# Patient Record
Sex: FEMALE | Race: WHITE
[De-identification: names, ages, dates, MRNs, and addresses within clinical notes are randomized per-mention and may not be internally consistent; named-entity substitution may affect disease eponyms.]

---

## 2019-07-03 ENCOUNTER — HOSPITAL ENCOUNTER (INPATIENT)
Dept: HOSPITAL 35 - SBH | Age: 84
LOS: 37 days | Discharge: HOSPICE | DRG: 884 | End: 2019-08-09
Attending: PSYCHIATRY & NEUROLOGY | Admitting: PSYCHIATRY & NEUROLOGY
Payer: COMMERCIAL

## 2019-07-03 VITALS — WEIGHT: 145.5 LBS | BODY MASS INDEX: 22.05 KG/M2 | HEIGHT: 68 IN

## 2019-07-03 DIAGNOSIS — F32.9: ICD-10-CM

## 2019-07-03 DIAGNOSIS — F01.50: Primary | ICD-10-CM

## 2019-07-03 DIAGNOSIS — K21.9: ICD-10-CM

## 2019-07-03 DIAGNOSIS — I50.9: ICD-10-CM

## 2019-07-03 DIAGNOSIS — Z79.82: ICD-10-CM

## 2019-07-03 DIAGNOSIS — E44.0: ICD-10-CM

## 2019-07-03 DIAGNOSIS — M19.90: ICD-10-CM

## 2019-07-03 DIAGNOSIS — Z88.0: ICD-10-CM

## 2019-07-03 DIAGNOSIS — I48.91: ICD-10-CM

## 2019-07-03 DIAGNOSIS — F02.81: ICD-10-CM

## 2019-07-03 DIAGNOSIS — Z79.899: ICD-10-CM

## 2019-07-03 DIAGNOSIS — Z79.01: ICD-10-CM

## 2019-07-03 DIAGNOSIS — J44.9: ICD-10-CM

## 2019-07-03 DIAGNOSIS — G30.9: ICD-10-CM

## 2019-07-03 DIAGNOSIS — I48.2: ICD-10-CM

## 2019-07-03 DIAGNOSIS — H61.21: ICD-10-CM

## 2019-07-03 PROCEDURE — 10880: CPT

## 2019-07-04 VITALS — DIASTOLIC BLOOD PRESSURE: 58 MMHG | SYSTOLIC BLOOD PRESSURE: 140 MMHG

## 2019-07-04 VITALS — SYSTOLIC BLOOD PRESSURE: 118 MMHG | DIASTOLIC BLOOD PRESSURE: 80 MMHG

## 2019-07-04 VITALS — SYSTOLIC BLOOD PRESSURE: 112 MMHG | DIASTOLIC BLOOD PRESSURE: 88 MMHG

## 2019-07-04 VITALS — SYSTOLIC BLOOD PRESSURE: 118 MMHG | DIASTOLIC BLOOD PRESSURE: 58 MMHG

## 2019-07-04 LAB
INR PPP: 2.8
PROTHROMBIN TIME: 29.5 SECONDS (ref 9.3–11.4)

## 2019-07-04 NOTE — NUR
PATIENT HIGH FALL RISK - UNSTEADY ON FEET. NEEDS MONITORING WHEN AMBULATING
WITH WALKER. TOOK MEDICATIONS READILY TODAY. NO AGITATION OR AGGRESSION OR
YELLING NOTED. DOES BECOME RESTLESS AND WANDERS NEEDING REDIRECTION. NEEDS
ENOCURAGEMENT AND HELP WITH MEALS. PATIENT DENIES ANY S/I - CONFUSED - ANSWERS
EVERYTHING DON'T KNOW WHAT TO DO NEXT. PATIENT SLEPT FOR AN HOUR IN AFTERNOON.
DOES NOT KNOW HER LIMITATIONS - DR. FLORES SPOKE WITH DAUGHTER ON MEDICAITON
RECONCILATION. MADE ADJUSTMENTS.

## 2019-07-04 NOTE — NUR
Pt restless in day room this ashly. Up and down from chair, pt is redirectable
with min assistance. Pt provided w/c for self propel, pt very unsteady with
walker. Pt compliant with hs meds and assisted with hs snack. Pt tried to
drink from her straw and lick her ice cream. Pt compliant with bsc and hs adl
care. Good eye contact, blunted affect. Speech clear but sentences not
comprehensible.
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
## 2019-07-04 NOTE — NUR
Daughter called stating she will be in tomorrow with a list of previous
medications. She stated Dr Freeman is no longer her mothers dr at the [facility]. She also stated she does not want her mother on sinement.

## 2019-07-04 NOTE — NUR
ARRIVED AS A DIRECT ADMIT FROM [facility] ER VIA [facility] X2 EMT ON A
STRETCHER @ 20:15 ON 07/03/19.  ADMITTING DIAGNOSIS MAJOR NEUROCOGNITIVE AND
BEHAVIOR DISORDERS. DNR WITH DAUGHTER RAINER LARSEN.  REFERRED TO [facility] FOR VEIGN
VERY AGITATED, YELLING AT STAFF CALLING OFFENSIVE NAMES, INCREASED DEMENTIA
AND STAFF AT N.. AT [facility] STRUGGLING TO MANAGE PT MED CHANGES.
/88 93 64 18 93% ON RA WEIGHT 143.3 POUNDS ON BED SCALE.
PIN SIZE WHOLE IN SKIN ON COXXYX.
BRUISE TO R KNEE AND L LE, BRUISE TO RIGHT WRIST.
SLEPT FOR SEVERAL HOURS, THEN AWOKE AND REQUESTED FOOD, SNACK PROVIDED.
QUIETED DOWN FOR AN HOUR THEN AWOKE, WAS CONFUSED AND AGITATED AND ANXIOUS,
PROVIDED 0LANZAPINE 5MG FOR AGITATION AND LORAZEPAM 0.5MG FOR ANXIETY.
PROVIDED ANOTHER SNACK, AN ENSURE BEVERAGE AT PT REQUEST, SHE RELAXED AND
SLEPT. BED IN LOW POSITION AND ALARM ON, WILL CONTINUE TO MONITOR Q12 MIN FOR
PATIENT SAFETY.

## 2019-07-05 VITALS — SYSTOLIC BLOOD PRESSURE: 110 MMHG | DIASTOLIC BLOOD PRESSURE: 70 MMHG

## 2019-07-05 VITALS — DIASTOLIC BLOOD PRESSURE: 54 MMHG | SYSTOLIC BLOOD PRESSURE: 116 MMHG

## 2019-07-05 NOTE — NUR
DID BECOME AGITATED AFTER SONS DEPARTURE FROM UNIT AT 1700-REFUSING TO
EAT,RUNNING WHEELCHAIR INTO TABLES IN DINING ROOM-RAISED VOICE AND ANGRY
FACIAL EXPRESSION-SAT 1;1 WITH PATIENT FOR APPROX 55 MINUTES OFFERING
SUPPORT,REASSURANCE AND REORIENTATION. ZYPREXA 5MG ODT GIVEN PO AT 1730-CALMER
BY APPROX 1830-GIVEN LATE SUPPER TRAY AND ASSISTED TO BED

## 2019-07-05 NOTE — NUR
ORIENTED TO NAME ONLY-SPEECH/CONVERSATION GARBLED AND DIFFICULT TO
FOLLOW-INCOMPREHENSABLE AT TIMES AT TIMES RELEVENT AND GOAL DIRECTED.
AMBULATES WITH USE OF ROLLER WALIKER AND SBA X 1-DENIES C/O PAIN
DISCOMFORT-ABLE TO FEED SELF.

## 2019-07-06 VITALS — DIASTOLIC BLOOD PRESSURE: 63 MMHG | SYSTOLIC BLOOD PRESSURE: 123 MMHG

## 2019-07-06 VITALS — SYSTOLIC BLOOD PRESSURE: 114 MMHG | DIASTOLIC BLOOD PRESSURE: 50 MMHG

## 2019-07-06 NOTE — NUR
PT was unable to complete psysocial assessment with JOY.  However Sarah, Pt's
daughter was avaliable by phone to complete the assessment.  Sw completed
assessment with Sarah.  Sarah and her brother Montrell are have completed a
medicaid application on Pt's behave.  They have bartolo dealing with mediciad for
the past 7 months.  According to Sarah, Pt has battled depression and Anxiety
since the age of 21.  Pt was seeing a phychiatrics at [facility] until Pt went to SNF about 2 years ago.  Joy assured Sarah, that Pt has
been calm today and engaged when encountered.  Sarah had no other questions
or concerns at this time.

## 2019-07-06 NOTE — NUR
PT TALKING OUT LOUD. WENT TO CHECK ON HER. SAT HER UP AND SHE VOIDED IN BSC.
PLACED PATIENT BACK IN BED AND SHE GROANED AND SAID HER LEFT LEG WAS HURTING.
ASSESSED IT AND SEEMS TO BE GENERALIZED IN HER THIGH/KNEE AREA. HURT TO RUB
BUT NOT RED OR WARM SPOTS. NO SOB. REPOSITIONED AND PLACED PILLOW UNDER IT
WHICH DID BRING GREAT RELIEF. PATIENT THEN STATES SHE IS HUNGRY. FED PATIENT A
VANILLA YOGURT. SHE DOESN'T MAKE MUCH SENSE WHEN SHE TALKS BUT SHE KEPT SAYING
SHE IS JUST OLD AND SHE IS  YEARS OLD AND THERE'S NOTHING THAT CAN BE
DONE FOR HER ACHES AND PAINS. PATIENT RESTLESS. GAVE HER OLANZAPINE 5MG ODT
PRN AND COVERED HER WITH EXTRA BLANKET BECAUSE SHE WAS COOL SHE SAID. PATIENT
ANSWERS YES WHEN ASKED IF SHE IS COMFORTABLE NOW. LET HER KNOW I WOULD BE BACK
TO KEEP CHECKING ON HER AND THAT SHE IS SAFE.  PATIENT SAYS THANK YOU. WILL
CONTINUE TO MONITOR.

## 2019-07-06 NOTE — NUR
PATIENT LAYING AWAKE AS I DID ROOM CHECK ON PATIENT. SHE MOTIONED TO HER LEFT
LEG AND INDICATED PAIN OR DISCOMFORT. PATIENT UNABLE TO GET APPROPRIATE WORDS
TO USE. REPOSITIONED PATIENT AND MASSAGED LEG. NO WARM/RED SPOTS. PATIENT WAS
ABLE TO SAY YES WHEN ASKED IF SHE WOULD LIKE SOMETHING FOR PAIN. TYLENOL 650MG
GIVEN WITH YOGURT AND WATER. PATIENT WAS BACK TO SLEEP WITHIN 10 MINUTES AND
SLEEPING SOUNDLY. WILL CONTINUE TO MONITOR.

## 2019-07-06 NOTE — NUR
ASSUMED CARE AT 0700 THIS MORNING.  PT. UP IN W/C ON UNIT FOR BREAKFAST AND
MORNING MEETINGS.  IS NOT FEEDING HERSELF, SHE IS TOO DISORGANIZED.  STAFF
ASSISTED HER IN EATING.  SPEECH IS WORD SALAD AND GARBLED.  SHE WILL SIT AND
LISTEN WHEN YOU TALK TO HER AS IF SHE UNDERSTANDS WHAT YOU ARE SAYING AND IS
ABLE TO FOLLOW SIMPLE COMMANDS.  DID ATTEND MORNING MEETINGS, BUT UNABLE TO
PARTICIPATE.  SHE SAT AND LISTENED THOUGH.  AT TIMES SHE LOOKED AS IF SHE
WOULD LIKE TO DO AS THE OTHER PATIENTS DID (STRETCHING EXERCISES), BUT UNABLE
TO DO MUCH.  VSS. NO S/S SI/HI.  UNABLE TO DETERMINE IF PT HAS AUDITORY OR
VISUAL HALLUCINATIONS OR NOT.  MEDICATIONS WERE CRUSHED AND PUT IN PUDDING.
SHE DID EAT THE PUDDING WITHOUT PROBLEMS NOTED.

## 2019-07-07 VITALS — DIASTOLIC BLOOD PRESSURE: 63 MMHG | SYSTOLIC BLOOD PRESSURE: 133 MMHG

## 2019-07-07 VITALS — DIASTOLIC BLOOD PRESSURE: 56 MMHG | SYSTOLIC BLOOD PRESSURE: 108 MMHG

## 2019-07-07 VITALS — SYSTOLIC BLOOD PRESSURE: 108 MMHG | DIASTOLIC BLOOD PRESSURE: 56 MMHG
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
## 2019-07-07 NOTE — NUR
ASSUMED PATIENT CARE AT 0715.  PATIENT ASSISTED UP X 1 STAFF, ATE 75%-90% OF
BREAKFAST.  TOOK MEDICATIONS WHOLE IN YOGURT WITH SIPS OF WATER.  SAT THROUGH
RECREATIONAL THERAPY; NURSE DID NOT GET REPORT FROM R.T. AS TO PATIENT'S
PARTICIPATION.  NURSE RE-ASSESSED PATIENT'S EARS WITH DR. FLORES'S OTOSCOPE,
WAX REMAINING, WHICH APPEARS SOFT.  HOWEVER, NO IRRIGATION TOOLS AVAILABLE,
PER HOUSE SUPERVISOR AND PHARMACY.  PATIENT CALM, COMOPERATIVE, NO AGITATION
OR IRRITATION TO DATE THIS SHIFT.  CONTINUE TO MONITOR.
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
## 2019-07-08 VITALS — SYSTOLIC BLOOD PRESSURE: 120 MMHG | DIASTOLIC BLOOD PRESSURE: 70 MMHG

## 2019-07-08 VITALS — DIASTOLIC BLOOD PRESSURE: 63 MMHG | SYSTOLIC BLOOD PRESSURE: 133 MMHG

## 2019-07-08 VITALS — DIASTOLIC BLOOD PRESSURE: 57 MMHG | SYSTOLIC BLOOD PRESSURE: 108 MMHG

## 2019-07-08 LAB
ERYTHROCYTE [DISTWIDTH] IN BLOOD BY AUTOMATED COUNT: 15.1 % (ref 10.5–14.5)
HCT VFR BLD CALC: 35.3 % (ref 37–47)
HGB BLD-MCNC: 11.7 GM/DL (ref 12–15)
INR PPP: 1.3
MCH RBC QN AUTO: 29.6 PG (ref 26–34)
MCHC RBC AUTO-ENTMCNC: 33.1 G/DL (ref 28–37)
MCV RBC: 89.5 FL (ref 80–100)
PLATELET # BLD: 351 THOU/UL (ref 150–400)
PROTHROMBIN TIME: 13.9 SECONDS (ref 9.3–11.4)
RBC # BLD AUTO: 3.95 MIL/UL (ref 4.2–5)
WBC # BLD AUTO: 10.2 THOU/UL (ref 4–11)

## 2019-07-08 NOTE — NUR
ASSUMED CARE OF THE PT AT 1900 PM.  THE PT WAS SITTING IN THE DAYROOM IN HER
WHEELCHAIR.  SHE TOOK HER MEDICATIONS CRUSHED IN APPLESAUCE WITHOUT ANY
DIFFICULTY.  HEART RATE REGULAR.  LUNGS CLEAR BILATERALLY, RESP., EVEN, AND
UNLABORED.  +BS HEARD IN ALL 4 QUADRANTS.  REMAINS ON 12 MINUTE CHECKS FOR HER
SAFETY.

## 2019-07-08 NOTE — NUR
Care assumed of patient at 1900: Patient up in w/c at start of shift in day
room.  Patient anxious at times.  Patient propelling w/c about the unit and in
other patient rooms.  Requiring re-direction occasionally.  Patient reporting
that she does not feel well.  Patient unable to state what is bothering her to
not feel well.  VS stable.  Patient ate 25% snack provided.  Declined to eat
her dinner.  Patient took medication with no issues.  Patient confused and
forgetful.  No aggression or agitation observed this shift.  Patient was able
to follow simple one step directions.  Patient overall pleasant and
cooperative.  Patient went to bed without difficulty.  Patient woke up at
approximately 2355 with one episode of emesis.  Moderate amount of emesis,
light brown in color.  Patient had ate peanut butter and crackers earlier in
the evening.  Patient cleaned up and linens changed.  Patient reported that
she felt better and would go back to sleep.  Patient is resting quietly at
this time.

## 2019-07-08 NOTE — NUR
ASSUMED CARE AT 0700 TODAY.  PT. IN BED ASLEEP AFTER N&V MOST OF THE NIGHT.
SHE REFUSED TO GET UP FOR BREAKFAST, SLEPT ALL MORNING.  THE ONELY MEDS THIS
RN WAS ABLE TO GET HER TO TAKE THIS MORNING WAS OLANZAPINE.  DR. FLORES AND
HOSPITALIST NOTIFIED OF SAME.  AT LUNCH THIS RN WENT IN AND GOT PT. DRESSED
AND BROUGHT HER TO THE DINING ROOM.  SHE ATE ABOUT 50% LUNCH.    HER SKIN TOME
IS PALE.  SHE SAT ON THE UNIT THIS AFTERNOON.  SHE HAD ICE CREAM FOR 2 PM
SNACK.  SHE WAS AWAKE AND ALERT ALL AFTERNOON, SITTING QUIETLY.  URSULA SI/HI.
NOT NOTABLE S/S AVH NOTED.  DID NOT ATTEND AFTERNOON GROUP THOUGH.

## 2019-07-09 VITALS — DIASTOLIC BLOOD PRESSURE: 67 MMHG | SYSTOLIC BLOOD PRESSURE: 94 MMHG

## 2019-07-09 VITALS — SYSTOLIC BLOOD PRESSURE: 111 MMHG | DIASTOLIC BLOOD PRESSURE: 61 MMHG

## 2019-07-09 NOTE — NUR
JOY met with the Health Coordinator at [facility]. JOY was told that the
pt may not be acceptable to the [facility]. JOY explained that if the pt was not given
a proper notice jjose, she would have to be accepted into [facility] or there will be
hotline to [facility]. The healthcare coordinator mention that she will follow-up
with SW after speaking with the adminstrator. JOY will follow-up with [facility] on
tomorrow, July 10, 2019

## 2019-07-09 NOTE — NUR
0720: Report rec from noc shift, care assumed.
0900: Assisted x2 staff with transfer from bed to w/c, cooperative with staff.
To  via w/c, oriented to name, minimal verbal response from pt when
inquiring about pain or needs. Feeds self with set-up assist and encouragement
to eat, takes meds crushed in applesauce w/o difficulty. Denies nausea or
stomach discomfort. Attends 0900 group, pleasant, minimal participation noted.

## 2019-07-10 VITALS — SYSTOLIC BLOOD PRESSURE: 136 MMHG | DIASTOLIC BLOOD PRESSURE: 55 MMHG

## 2019-07-10 VITALS — SYSTOLIC BLOOD PRESSURE: 148 MMHG | DIASTOLIC BLOOD PRESSURE: 72 MMHG

## 2019-07-10 NOTE — NUR
ASSUMED CARE @ 1900, SITTING IN W/C IN FRONT OF THE TV.  DOES NOT SEEM TO BE
WATCHING TV, JUST LOOKING FORWARD INTO THE ROOM.  A&O X 1 ABLE TO VERBALIZE
HER FIRST NAME ONLY.  SPEACH RAMBLING, BLUNTED AND DIFFICULT TO UNDERSTAND.
INCONTINENT, NO BM TODAY.  TAKES MEDS CRUSHED IN PUDDING.  VSS HRRR, LUNGS
CTA, AABD NORMOACTIVE.  HX OF COFFEE GROUND EMESIS.  NO EMESIS TODAY.  WILL
CONTINUE TO MONITOR Q 12 MINUTES.

## 2019-07-10 NOTE — NUR
SCHEDULED MEDICATIONS PROVIDED.  PRN MORPHINE S.L. 10 MG PROVIDED @ 22:20.
OLANZAPINE S.L. PROVIDED @ 22:20.  [place] 4 MG PROVIDED FOR NAUSEA.  EAR
DROPS INSTILLED WITHOUT DIFFICULTY. NO BEHAVIORS NOTED.  WILL CONTINUE TO
MONITOR Q 12 MINUTES FOR PATIENT SAFETY.

## 2019-07-10 NOTE — NUR
Care assumed of patient at 1900: Patient resting in bed at start of shift.
Staff offered to assist patient get up out of bed and have a snack in the day
room.  Patient stated she would like to get up.  Max assist x2 provided for
transfer to /.  Patient ate approximately 75% snack.  Enjoyed sitting with
other peers and staff.  Patient took medications crushed without any issue.
Patient pleasant and cooperative.  Patient had periods where she would speak
clearly then others where she was speaking word salad.  Patient encouraged to
slow down and focus.  Patient apologetic.  She was then able to answer
questions more clearly.  Patient anxious at times when speaking with her.
Confused and forgetful.  Attempted to suck on spoon like it was a straw when
eating jello.  Required occasional directions while eating.  Alert and
oriented to person only.  Blunted affect, disorganized thoughts.  Patient
incontinent of bladder.  Patient again apologetic, stating that it is hard to
be "89 something years old".  No agitation or aggression observed this shift.
 Resting quietly in bed at this time.

## 2019-07-10 NOTE — NUR
0700: Report rec from noc shift, care assumed. Resting in bed, laying on Rt
side, bed alarm on, bedrails up x4, awakens readily to verbal stimuli. Skin
cool, color pale.
0800: Assisted to w/c from bed with assist x2 staff, pt weak, minimal weight
bearing noted, confused, oriented to name only, but cooperative with staff. To
 via w/c

## 2019-07-11 VITALS — SYSTOLIC BLOOD PRESSURE: 119 MMHG | DIASTOLIC BLOOD PRESSURE: 56 MMHG

## 2019-07-11 VITALS — SYSTOLIC BLOOD PRESSURE: 120 MMHG | DIASTOLIC BLOOD PRESSURE: 72 MMHG

## 2019-07-11 NOTE — NUR
PT ASLEEP IN W/C IN DAY ROOM UPON ARRIVAL TO SHIFT. HS MEDS PROVIDED AND PT
LAYED DOWN TO BED WITH ALARM ON. POOR EYE CONTACT WITH INTERACTIONS, BLUNTED
AFFECT.

## 2019-07-11 NOTE — NUR
PT OUT TO DINNING ROOM. PT HAS SMILE OF FACE. PT IN W/C WITH HEMANT HARO.
PCA STATED THAT SHE HAD A BM THIS AM MED AMOUNT. PT HAS ISSUES WITH HER
STOMACH AND COMPLAINING PAIN, STATED IT HURTS DOWN.

## 2019-07-11 NOTE — NUR
ASSISTED PT UP TO W/C. PT HAVING SOME BELCHING. GAVE PT DIET LEMON-LIME SODA
AND SALTINE CRACKERS, PT WAS VERY APPRICIATED.

## 2019-07-11 NOTE — NUR
PT UP TO BSC TO PASS GAS. PT STATED SHE DOESN'T FEEL GOOD. PT HOLDING STOMACH.
PT SLOW TO GETTING UP AND TURNING, UP X2 ASSIST. PT STATED SHE WOULD LIKE TO
LAY DOWN. PT BELCHING AT TIMES.

## 2019-07-11 NOTE — NUR
SW sent a referral to [facility] per family request. JOY will follow-up
with [facility] to see if pt can be evaluated on tomorrow, July 12, 2019.
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
## 2019-07-12 VITALS — SYSTOLIC BLOOD PRESSURE: 112 MMHG | DIASTOLIC BLOOD PRESSURE: 59 MMHG

## 2019-07-12 VITALS — DIASTOLIC BLOOD PRESSURE: 42 MMHG | SYSTOLIC BLOOD PRESSURE: 104 MMHG

## 2019-07-12 NOTE — NUR
PT AWAKENED TO URINATE ASSISTED TO
 BSC AND COMPLIANT. PT RETURNED TO BED, YELLING OUT THAT SHE WANTS
TO BE YOUNG, CRYING SPEECH NOT CLEAR,CURSING. KICKING LEGS,
 PRN PAIN PROVIDED. STAFF SAT WITH PT FOR DIVERSIONAL CONVERSATION, OFFERED
SNACKS, BEHAVIOR DID NOT SETTLE
PRN FOR AGIATION PROVIDED.

## 2019-07-12 NOTE — NUR
Date of Admission: 07/03/19
Date of Activity Therapy Assessment: 07/06/19
Activity Goal: Increase engagement
Initial Goal: 1 Group activity/day
Weekly progress towards goal: On track
Group participation level: Minimal
Behaviors observed: Patient participates in a minimum of 1 recreational therapy
group per day though participation is minimal d/t low cognition. Patient
presents smiling and pleasant. No aggression noted.
Plan: No change towards goal

## 2019-07-12 NOTE — NUR
JOY sent a referral to [facility] per the [facility] request. JOY spoke with
Maureen in she mention she revievew the referral, and will be in contact on
July 15, 2019.

## 2019-07-12 NOTE — NUR
0700: Report rec from noc shift,care assumed.
0800: Assisted to w/c from bed with staff x1, gait weak. To DR via w/c, feeds
self with set-up assist, appetite poor, takes 1-2 bites of food, takes meds
crushed in pudding with difficulty and resistance. Cooperative with
staff,affect is flat, short attentions span and confusion noted.
0900: To therapy group, minimal participation noted. Denies nausea or
discomfort at this time.

## 2019-07-13 VITALS — DIASTOLIC BLOOD PRESSURE: 43 MMHG | SYSTOLIC BLOOD PRESSURE: 99 MMHG

## 2019-07-13 NOTE — NUR
PT REMAINED AWAKE RESTLESS TALKING TO SELF
AND WAS TAKEN TO DAY ROOM FOR DISTRACTION AND A SNACK. ONCE PT
WAS FALLING ASLEEP IN DAY ROOM, SHE WAS ASSISTED BACK TO BED.

## 2019-07-13 NOTE — NUR
ASSUMED PATIENT CARE AT 0700.  PATIENT DRESSED,ASSIST TIMES ONE.  ATE 75% OF
BREAKFAST, BLUNT FACIAL EXPRESSION, FLAT AFFECT, DROSY MOOD.  TOOK A.M.
MEDICATIONS WHOLE WITH WATER.  SAT THROUGH A.M.GROUP, NON-PARTICIPATORY.

## 2019-07-14 VITALS — SYSTOLIC BLOOD PRESSURE: 107 MMHG | DIASTOLIC BLOOD PRESSURE: 46 MMHG

## 2019-07-14 VITALS — SYSTOLIC BLOOD PRESSURE: 109 MMHG | DIASTOLIC BLOOD PRESSURE: 60 MMHG

## 2019-07-14 VITALS — SYSTOLIC BLOOD PRESSURE: 99 MMHG | DIASTOLIC BLOOD PRESSURE: 43 MMHG

## 2019-07-14 VITALS — DIASTOLIC BLOOD PRESSURE: 60 MMHG | SYSTOLIC BLOOD PRESSURE: 109 MMHG

## 2019-07-14 NOTE — NUR
JOY sent updates to [facility] [phone] and [facility] [phone]
[phone]
[phone] ([phone]

## 2019-07-14 NOTE — NUR
SPENT MAJORITY OF SHIFT IN DAYROOM. EASILY
AGITATED BY PEERS WHEN STIMULI IS INCREASED. STAFF ATTEMPTED REDIRECTION
VERBALLY AND REMOVED TO QUIETER AREA BUT INTERVENTIONS WERE NOT SUCCESSFUL IN
DECREASING AGITATION AND PT RECEIVED GEODON IM PRN PER ORDER WHICH WAS
EFFECTIVE IN REDUCING AGITATION. YADIEL CARE PROVIDED PRN. NO ACUTE DISTRESS
NOTED. CONTINUE TO MONITOR.

## 2019-07-14 NOTE — NUR
ASSUMED CARE OF PT @ 0700. ASSISTED W/AM CARE W/ASSIST OF ONE. TRANSPORT VIA
WHEELCHAIR - MOANING AND COMPLAINING OF GENERALIZED PAIN THIS AM -RECEIVED PRN
MEDS PER ORDER. RESTED QUIETLY FOLLOWING WITHOUT FURTHER DISTRESS NOTED.
ATTENDING AM GROUP W/NO PARTICIPATION. VISITOR PRESENT DURING VISTATION. NO
ACUTE DISTRESS NOTED. CONTINUE TO MONITOR THROUGHOUT SHIFT.

## 2019-07-14 NOTE — NUR
PT OUT IN DAY AREA AT [facility] OF SHIFT, BUT ON THE EDGE OF THE GROUP. SUSPICIOUS,
BUT TOOK HS MEDS AS PRESCRIBED. ALLOWED STAFF TO ASSESS, AND DO VS. ESCORTED
TO ROMM AND ASSISTED WITH TOILETING. SLEPT WELL THROUGH THE NIGHT

## 2019-07-15 VITALS — SYSTOLIC BLOOD PRESSURE: 109 MMHG | DIASTOLIC BLOOD PRESSURE: 57 MMHG

## 2019-07-15 VITALS — SYSTOLIC BLOOD PRESSURE: 110 MMHG | DIASTOLIC BLOOD PRESSURE: 60 MMHG

## 2019-07-15 VITALS — DIASTOLIC BLOOD PRESSURE: 57 MMHG | SYSTOLIC BLOOD PRESSURE: 109 MMHG

## 2019-07-15 NOTE — NUR
JOY spoke with pt daughter concerning sending a referral to [facility]. SW
mention that she will send a referral on tomorrow, July 15, 2019. JOY
explained that there need to be referrals sent to [facility]. JOY
explained that [place] medicaid is causing [facility] to deny her mother. SW will
follow-up with pt family on later this week.

## 2019-07-15 NOTE — NUR
INCREASED AGITATION AND PHYSICAL VIOLENCE NOTED.  [facility] ORDER [facility] HAL[facility]OL IM 5 [facility] AND PROVIDED WITH X3 ADDITIONAL ASSIST.  WILL
CONTINUE TO MONITOR.

## 2019-07-15 NOTE — NUR
IN BED EYES CLOSED, RESPIRATIONS EVEN AND UNLABORED.  BED IN LOW POSITION,
ALARM SET, WILL CONTINUE TO MONITOR Q 12 MINUTES FOR PT SAFETY.

## 2019-07-15 NOTE — NUR
ASSUMED CARE AT 0700 THIS MORNING.  PT. CALM MOST OF THE DAY.  ABOUT 1645 SHE
STARTED FUSSING AT A PEER, POUNDING ON THE TABLE, STATING NO ONE WILL HELP
HER.  SHE COULD NOT STATE WHAT THE HELP SHE NEEDED WAS.  SHE WAS GIVEN SL
OLANZAPINE AT 1730.  SHE WANDERS INTO PEERS ROOMS AND IS IRRITABLE WHEN SHE
WAS REMOVED FROM THE ROOMS.  NO S/S SI/HI OR AVH.  TOOK MEDS WITHOUT PROBLEMS
FOR THIS RN.

## 2019-07-15 NOTE — NUR
ASSUMED CARE @ 19:00.  IN W/C IN DAY ROOM.  PROPELLING SELF IN W/C AROUND THE
FRONT OF THE ROOM UNDER THE TV SET.  TOOK 2100 MEDS CRUSHED IN ICE CREAM.
LAID DOWN @ 22:30, EYES CLOSED RESPIRATIONS EVEN AND UNLABORED.  AWOKE @ 0030
C/O FEELING BAD.  GIVEN ONDONSTATON 4 MB ODT AND MORPHINE SULFATE 10 MG.
CONTINUES TO BE AWAKE AND C/O FEELING BAD, CANNOT SAY WHAT FEELS BAD. TOILETED
ON THE BSC, URINE OUTPUT NOTED.  RETURNED TO BED. WILL CONTINUE TO  MONITOR.

## 2019-07-16 VITALS — DIASTOLIC BLOOD PRESSURE: 42 MMHG | SYSTOLIC BLOOD PRESSURE: 106 MMHG

## 2019-07-16 VITALS — SYSTOLIC BLOOD PRESSURE: 106 MMHG | DIASTOLIC BLOOD PRESSURE: 42 MMHG

## 2019-07-16 VITALS — SYSTOLIC BLOOD PRESSURE: 102 MMHG | DIASTOLIC BLOOD PRESSURE: 56 MMHG

## 2019-07-16 NOTE — NUR
ASSUMED CARE @ 1900, IN W/C WITH HEMANT HARO IN PLACE IN THE DAY ROOM.
COOPERATED WITH ASSESSMENT AND HS MEDS IN PUDDING.  A&O X 1 CONFUSED AS TO
DATE, LEADERSHIP AND REASON FOR HOSPITALIZAITON.  RESHMA IS CONFUSED AND
UNCLEAR.  WILL CONTINUE TO MONITOR Q 12 MINUTES FOR PATIENT SAFETY.

## 2019-07-16 NOTE — NUR
Patient was assisted to bed at 2030 on 7/15/19.  Patient asked for the lights
to be kept on.  The lights were kept on.  Patient changed into a gown,
assisted to the bathroom then tucked into bed.  Patient did state she was
tired and wanted to lay down.  Patient sat up soon after and started to yell.
Staff asked patient if she wanted to get back up, patient yelled no.  Patient
attempting to stand and walk independently.  Patient would not allow for staff
to assist her.  Patient continued to scream at staff, kick at staff and hit
staff.  Multiple staff attempted to re-direct patient.  Not effective.
Nurse administered Geodon 10mg IM at 2102.  Nurse sat with patient for
approximately 10 minutes, talking to her, trying to calm her down and divert
her attention.  Patient was able to fall asleep at approximately 2130 and has
been resting quietly throughout the shift.  Patient's daughter did call at
0500 this AM.  Daughter asked if patient had been tested for a UTI.  UA was
completed at [facility] on 7/2/19 prior to admission which was negative for UTI.
Will pass on to day shift RN.

## 2019-07-16 NOTE — NUR
ASSUMED CARE AT 0700 THIS MORNING.  PT. IN THE DINING ROOM, PULLED DOWN HER
PANTS AND URINATED ON THE FLOOR.  STAFF TOOK HER TO THE BATHROOM.  SHE HAD A
LARGE BOWEL MOMENT.  SHE WAS CLEANED UP AND TAKEN TO THE DINING ROOM FOR
BREAKFAST.  SHE ATE ON THE UNIT.  TOOK HER MEDS WITHOUT PROBLEMS NOTED.
DENIES SI/HI.  BUT TALKS ABOUT HEARING VOICES.

## 2019-07-17 VITALS — DIASTOLIC BLOOD PRESSURE: 91 MMHG | SYSTOLIC BLOOD PRESSURE: 160 MMHG

## 2019-07-17 VITALS — SYSTOLIC BLOOD PRESSURE: 116 MMHG | DIASTOLIC BLOOD PRESSURE: 63 MMHG

## 2019-07-17 VITALS — SYSTOLIC BLOOD PRESSURE: 160 MMHG | DIASTOLIC BLOOD PRESSURE: 91 MMHG

## 2019-07-17 NOTE — NUR
JOY sent a referral to [facility]. JOY attn: to Arnie Kaur. JOY will
follow-up with the [facility] on tomorrow, July 18, 2019. Pt is ready to d/c once
accepted into [facility].

## 2019-07-17 NOTE — NUR
ASSUMED PT CARE REPORT RECEIVED FROM NURSE. PT IS VERY CONFUSED. OUT OF BED
WITH NURSING STAFF HELP. PT REMAINS ON WHEELCHAIR DURING DINING TIME. VSS. PT
DENIES PAIN. PT LOOKS DROWSY IN THE EARLY AM. NO COMPLAINT. WILL CONTINUE TO
MONITOR

## 2019-07-17 NOTE — NUR
Date of Admission: 07/03/19
Date of Activity Therapy Assessment: 07/06/19
Activity Goal: Increase self esteem and social engagement
Initial Goal: 1 Group activity/day
Weekly progress towards goal: On track
Group participation level: Minimal
Behaviors observed: Patient attends most morning groups though she falls asleep
nearly every group upon beginning to end. Patient often exhibits frustration
and agitation in the afternoon, making attendance to groups minimal.
Plan: No change towards goal

## 2019-07-17 NOTE — NUR
Nutrition: pt seen per followup on [facility] unit. Vomiting is resolved and pt
eating variable amounts of meals, average 50%. Per team meeting this morning
pt took more initiative/showed more interest at breakfast in feeding self.
Takes sporadic amounts of supplements. WIll trial ensure enlive in addition to
ensure pudding due to fair intake. Weight is up 4# from previous eval. Pt on
lasix and pointed out fluid retention in legs. Regular diet, would not rec
further diet restrictions in hospice pt. Change status to low risk.

## 2019-07-17 NOTE — NUR
PATIENT SITTING UP IN BED. PATIENT HAD ZOFRAN AT 1500 AND MYLANTA AT 1700.
PATIENT WITHOUT NAUSEA OR VOMITING BUT STATES ACHY ALL OVER. COOL TO TOUCH
AFEBRILE. BLE'S WITH 2+ NONPITTING EDEMA, COOL TO TOUCH. TRYING TO ENCOURAGE
BM. HAS HAD GAS PAINS TODAY AND SMALL BM ACCORDING TO DAY REPORT.  HAD
PATIENT'S HEAD ELEVATED 45 DEGREES UP AND ELEVATED FEET. PATIENT MORE
COMFORTABLE ON RIGHT SIDE. TYLENOL 650MG WITH APPLESAUCE GIVEN. TOOK HALF OF
CRUSHED DOSE AND SIP OF WATER. COULD NOT TOLERATE ANYMORE BY PO. SPOKE WITH
PCA'S REGARDING KEEPING PATIENT COMFORTABLE AND HOB AND FEET ELEVATED D/T
EDEMA, N/V FROM EARLIER TODAY. WILL REMEDICATE AS NEEDED AT NEXT AVAILABLE MED
TIME. MONITORING PATIENT CLOSELY.  BOWEL SOUNDS ACTIVE LEFT UPPER ABDOMEN QUAD
AND HYPO IN OTHER 3 QUADS.

## 2019-07-17 NOTE — NUR
THIS EVENING PATIENT IS STILL RESTING IN BED. MEDS WERE GIVEN CRUSHED AND IN
MYLANTA. PATIENT ABLE TO TOLERATE SMALL SIPS OF WATER. PATIENT HAS BEEN
REPOSITIONED IN BED BUT FAVORS HER RIGHT SIDE AND MOVES HERSELF INTO THIS
POSITION. PATIENT GETS NAUSEATED AND HURTS WHEN MOVED AROUND. HAS NOT VOIDED
OR BEEN ABLE TO GET UP TO BSC. BLADDER SCAN SHOWS 178 ML IN BLADDER. BOWEL
SOUNDS NOW MORE ACTIVE IN ALL 4 ABDOMEN QUADS. PATIENT DID VOMIT UP SMALL
YELLOW STOMACH BILE AFTER SIPPING MYLANTA. ZOFRAN SL GIVEN AND PATIENT
TOLERATED WELL. MORPHINE PRN SL GIVEN WITH HS MEDS. PATIENT STATES SHE IS
FEELING A LITTLE MORE COMFORTABLE. FEET STILL WITH 2+ EDEMA ELEVATED IN BED.
HOB ELEVATED UP 45 DEGREE ANGLE. CONTINUING TO MONITOR.

## 2019-07-18 VITALS — DIASTOLIC BLOOD PRESSURE: 73 MMHG | SYSTOLIC BLOOD PRESSURE: 96 MMHG

## 2019-07-18 NOTE — NUR
JOY met with Jose stock and he completed an assessment of this pt. He
reported that he would be getting in contact with the family and this SW once
a decision is made regarding placement.

## 2019-07-18 NOTE — NUR
PATIENT WAS UP IN WHEELCHAIR WITH ASSIST OF STAFF FOR BREAKFAST, APPETITIE
POOR "I DON'T WANT ANY OF THAT TO EAT". SHE STAYED UP FOR GROUP, TOOK A QUICK
NAP AFTER GROUP. WHEN AWAKEN, PATIENT WAS NAUSIATED, AND HAD SMALL EMESIS. PRN
ZOFRAN GIVEN, WITH POSITIVE EFFECT. PATIENT REFUSED LUNCH, REQUESTED TO LAY
DOWN. PATIENT TAKING SIPS OF WATER. PATIENT CURRENTLY IN BED TAKING A NAP.
PATIENT IS NOT ABLE TO RESPOND APPROPRIATELY TO ASSESSMENT QUESTIONS DUE TO
COGNITIVE IMPAIREMENT. [facility] HOSPICE STAFF CAME IN TODAY TO ASSESS PATIENT,
WILL REPORT BACK TO THEIR FACILITY AND THEY WILLL LET US KNOW. GERONIMO LOWER
EXTREMITY ELEVATED IN BED, WILL MONITOR FOR SAFETY.

## 2019-07-19 VITALS — DIASTOLIC BLOOD PRESSURE: 45 MMHG | SYSTOLIC BLOOD PRESSURE: 117 MMHG

## 2019-07-19 VITALS — SYSTOLIC BLOOD PRESSURE: 124 MMHG | DIASTOLIC BLOOD PRESSURE: 65 MMHG

## 2019-07-19 NOTE — NUR
NURSES NOTE - PATIENT IS ALERT AND ORIENTED X1-2 AT TIMES. DURING INTIAL
GREETING PATIENT WAS IN WC IN DAY ROOM WITH HEAD DOWN MINIMALLY COMMUNICATING
WITH OTHER PATIENTS. SHE APPEARS WITH A FLAT AFFECT. THIS NURSE ASKED
ASSESSMENT QUESTIONS AND SHE WOULD RESPOND WITH SHORT ANSWERED RESPONSES. SHE
WAS PLEASANT DURING TIME OF ASSESSMENT, YET HER MOOD IS LABILE THROUGHOUT THE
EVENING. AT APPROXIMATELY 0112 SHE HAD A LARGE LOOSE BM. SHE DID NOT REPORT SI
HI THOUGHTS, SHE DID NOT REPORT MEDICAL CONCERNS, NOR DID SHE APPEAR TO BE IN
DISTRESS. NURSING WILL MAINTAIN ALL PRECAUTIONS TO ENSURE SAFETY AT ALL TIMES.

## 2019-07-19 NOTE — NUR
Malvin spoke with pt's daughter at length about the diffiucalty of placing her mom
in a secure unit that is memory care  and takes medicaid pending. Malvin provided
her with 5 more communities to call and visit, and Malvin will send referrals to
them on Saturday.

## 2019-07-19 NOTE — NUR
Pt was denied admission to [facility]. Rep Wilmer  stated
that this was due to excessive behaviors. He also stated he had called the
family and left them a VM. Sw will follow up with family

## 2019-07-19 NOTE — NUR
ASSUMED CARE OF THE OF THE PT AT 1915 PM.  THE PT WAS IN THE DAYROOM WHEN THIS
WRITER CAME ON DUTY.  ALERT ET ORIENTED X 3.  MAKES NEEDS KNOWN.  TOOK HER HS
MEDICATIONS WITHOUT ANY DIFFICULTY.  THE PT HAD A LARGE BM THIS SHIFT AND WAS
TOILETED BEFORE SHE HAD TO GO TO THE BATHROOM.  SHE ATE HER HS SNACK.  HEART
RATE REGULAR.  LUNGS CLEAR BILATERALLY, RESP., EVEN, AND UNLABORED.  +BS HEARD
IN ALL 4 QUADRANTS.  SHE HAD EMESIS X 2 THIS SHIFT.  REMAINS ON 12 MINUTE
CHECKS FOR HER SAFETY.

## 2019-07-19 NOTE — NUR
ASSUMED PT CARE AT 0700. PT WITH FLAT AFFECT, CALM. AMBULATES BY W/C. MEDS ARE
CRUSHED AND GIVEN WITH APPLESAUCE OR PUDDING, PT HAS HAD NO EMESIS TODAY, DID
HAVE A LARGE BM THIS AM. AUDITORY HALLUCINATIOS NOTED. WILL CONT POC.

## 2019-07-20 VITALS — SYSTOLIC BLOOD PRESSURE: 120 MMHG | DIASTOLIC BLOOD PRESSURE: 76 MMHG

## 2019-07-20 VITALS — SYSTOLIC BLOOD PRESSURE: 114 MMHG | DIASTOLIC BLOOD PRESSURE: 68 MMHG

## 2019-07-20 NOTE — NUR
AAOX1 ONLY CONFUSED AND DIFFICULT TO UNDERSTAND SPEECH. ATE ALL 3 MEALS IN
DINNING ROOM. POOR APPETITE.  FAMILY HERE TO VISIT TODAY.

## 2019-07-21 VITALS — SYSTOLIC BLOOD PRESSURE: 137 MMHG | DIASTOLIC BLOOD PRESSURE: 69 MMHG

## 2019-07-21 VITALS — SYSTOLIC BLOOD PRESSURE: 107 MMHG | DIASTOLIC BLOOD PRESSURE: 55 MMHG

## 2019-07-21 NOTE — NUR
AAOX1 CONFUSED AND FORGETFUL.  REFUSES TO EAT STATES ALL OF THE FOOD IS
TERRIBLE.  IN DINING ROOM MOST OF DAY.  ATTENDED GROUPS AND ATE MEALS IN
DINING ROOM.  FLAT AFFECT PLEASANT AND COOPERATIVE.

## 2019-07-21 NOTE — NUR
JOY called [facility]  to request some more SNF to send referrals
to. She requested that updated notes be sent to [facility] [phone], [facility]
[phone], [facility]  and [facility] . She
reported that the medicaid application had been recieved and now had
escalation status.

## 2019-07-21 NOTE — NUR
ASSUMED CARE OF THE PT AT 1915 PM.  THE PT WAS LYING IN BED, SHE KEPT TRYING
TO GET OUT OF THE BED, ASSISTED HER TO THE WHEELCHAIR AND THEN WE TOOK HER TO
THE DAYROOM, WHERE SHE TOOK HER HS MEDICATIONS AND THEN WE ASSISTED THE PT
BACK TO BED AFTER A COUPLE OF HOURS, THEN WHEN SHE WAS IN BED, SHE WAS BANGING
THE BED, SHAKING THE LOWER RAILS.  SCREAMING AT STAFF, "GET ME OUT OF HERE."
CONTINUED TO BE AGITATED.  MEDICATED THE PT WITH GEODON 10 MG IM TO HER LEFT
HIP.  AFTER RECEIVING HER SHOT, SHE STOOD UP BEDSIDE THE BED, ASSISTED THE PT
TO THE WHEELCHAIR AND ASSISTED HER TO THE DAYROOM.  REMAINS ON 12 MINUTE
CHECKS FOR HER SAFETY.

## 2019-07-22 VITALS — SYSTOLIC BLOOD PRESSURE: 133 MMHG | DIASTOLIC BLOOD PRESSURE: 77 MMHG

## 2019-07-22 VITALS — DIASTOLIC BLOOD PRESSURE: 77 MMHG | SYSTOLIC BLOOD PRESSURE: 133 MMHG

## 2019-07-22 NOTE — NUR
[id number]: Report rec from noc shift, care assumed. To DR williamson w/c, declines
a.m. meal, resists staff feeding her, meds given crushed in applesauce.
Garbled speech noted, oriented to name only, only able to follow simple
instructions, forgetful. Attends 0900 therapy group, this nurse assisted pt
with exercise group, accepting of help from this nurse.
1200: Resistant to meal and to staff to assist.
1600: Dtr Sarah here to see pt, update given and questions answered. No
behaviors noted this shift.

## 2019-07-22 NOTE — NUR
JO[facility] rec[facility] informed they will not be able to
accommodate the Pt's needs and would not be accepting Pt.
 
JOY recieved a call from Carolina at [facility].  Carolina informed they
will be sending out a clinical team this week to assess Pt for placement.
Carolina did not give a specific day .

## 2019-07-22 NOTE — NUR
ASSUMED CARE OF THE PT AT 1915 PM.  THE PT WAS IN BED WHEN THIS WRITER CAME ON
DUTY.  HEART RATE REGULAR.  LUNGS CLEAR BILATERALLY, RESP, EVEN, AND
UNLABORED.  DENIES SI/HI/A/D/ A/V HALLUNICATIONS.  REMAINS ON 12 MINUTE CHECKS
FOR HER SAFETY.

## 2019-07-23 VITALS — DIASTOLIC BLOOD PRESSURE: 43 MMHG | SYSTOLIC BLOOD PRESSURE: 99 MMHG

## 2019-07-23 VITALS — SYSTOLIC BLOOD PRESSURE: 97 MMHG | DIASTOLIC BLOOD PRESSURE: 54 MMHG

## 2019-07-23 LAB
ANION GAP SERPL CALC-SCNC: 3 MMOL/L (ref 7–16)
BUN SERPL-MCNC: 18 MG/DL (ref 7–18)
CALCIUM SERPL-MCNC: 9.2 MG/DL (ref 8.5–10.1)
CHLORIDE SERPL-SCNC: 101 MMOL/L (ref 98–107)
CO2 SERPL-SCNC: 33 MMOL/L (ref 21–32)
CREAT SERPL-MCNC: 0.8 MG/DL (ref 0.6–1)
GLUCOSE SERPL-MCNC: 90 MG/DL (ref 74–106)
POTASSIUM SERPL-SCNC: 3.9 MMOL/L (ref 3.5–5.1)
SODIUM SERPL-SCNC: 137 MMOL/L (ref 136–145)

## 2019-07-23 NOTE — NUR
PT QUIET THIS PM. ALLOWED STAFF TO ASSESS AND TO GIVE HER EVENING MEDS.
ESCORTED TO BED AFTER SNACKS AND MEDS. SLEPT WELL THROUGHT THE NIGHT, W/O
INCIDENT.

## 2019-07-23 NOTE — NUR
NURSES NOTE - DEVEN IS ALERT AND ORIENTED TO SELF, SHE IS COOPERATIVE IN THE
DAY ROOM AND INTERACTING WITH OTHER PEERS. SHE IS PLEASANT DURING ONE TO ONE
WITH PATIENT APPEARS WITH A EUTHYMIC AFFECT ET MOOD. SHE DENIES SI HI AND
HALLUCINATIONS. SHE DID NOT VERBALIZE ANY MEDICAL CONCERNS AND DOES NOT APPEAR
TO BE IN DISTRESS. SHE USES WC ON THE UNIT. THIS NURSE AND PCA PUT PT IN BED
FOR HS, SHE DID APPEAR IN PAIN AND MORPHINE PRN WAS GIVEN TO PATIENT.
CURRENTLY UPON REASSESMENT VISUALLY PATIENT IS IN BED WITH EYES CLOSED AND
DOES NOT APPEAR TO BE IN DISTRESS. NURSING WILL MAINTAIN ALL PRECAUTIONS TO
ENSURE SAFETY AT ALL TIMES.

## 2019-07-23 NOTE — NUR
PATIENT IS FORGETFUL, AND CONFUSED. REQUIRES ASSIST OF TWO STAFF TO TRANSFER
FROM W/C TO BED. PATIENT IS TOTOAL CARE NEED ASSIST OF STAFF TO COMPLET ADKL.
PATIENT TOOK ALL HER MEDICATION CRUSHED IN PUDDING WITHOUT DIFFICULTY.
APPETITE GOOD, CONSUMED 100% BREAKFAST, HAD ABOUT 25% LUNCH. PATIENT DENIES
SUICIDAL AND HOMCIDAL IDEATION. PATIENT REFUSES TO RESPOND APPROPRIATELY TO
FURTHER ASSESSMENT QUESTIONS. PATIENT PARTICIPATING IN GROUP THERAPY. SHE
DECLINE HAVING PHYSICAL PAIN. PATIENT HAD LARGE FORMED BOWEL MOVEMENT TODAY,
NO AGGRESSION OR AGITATION NOTED AT THIS TIMES, WILL MONITOR FOR SAFETY.

## 2019-07-24 VITALS — SYSTOLIC BLOOD PRESSURE: 130 MMHG | DIASTOLIC BLOOD PRESSURE: 68 MMHG

## 2019-07-24 VITALS — SYSTOLIC BLOOD PRESSURE: 104 MMHG | DIASTOLIC BLOOD PRESSURE: 64 MMHG

## 2019-07-24 NOTE — NUR
ASSUMED CARE AT 0700 TODAY.  PT. UP IN W/C.  ATE MEALS ON THE UNIT. SHE ALWAYS
TELLS STAFF SHE IS HUNGRY BUT WHEN HER FOOD COMES SHE POURS THE LIQUIDS ALL
OVER THE TABLE AND FLOOR, SHE PUTS HER HANDS IN PUDDINGS, SAUCES, AND GRAVYS
AND SMEARS IT ALL OVER THE PLACE.  WHEN STAFF ATTEMPT TO CLEAN IT UP SHE WILL
SAY, "HEY, I WANTED THAT!  WHY ARE YOU TAKING THAT.  SHE EATS 20% OR LESS OF
EACH MEAL BEFORE SHE STARTS TO PLAY WITH THE FOOD.  WHEN ASKED WHY SHE DID NOT
EAT THE FOOD, SHE WILL SAY, "I DON'T WANT THAT!"  SHE POUNDED HER FIST ON THE
TABLE WHEN THE STAFF DID NOT RETURN HER TRAY.
ATTENDED
GROUPS, BUT UNABLE TO PARTICIPATE MUCH.  CONTINIUES TO BE DISORGANIZED,
FORGETFUL, UNABLE TO ALWAYS COMMUNICATE WITH STAFF.  SHE TALKS IN WORD SALAD
SOME OF THE TIME, BUT AT TIMES CAN MAKE HER NEEDS UNDERSTOOD. HER MEDICATIONS
ARE CRUSHED AND PUT IN PUDDING OR ICE CREAM SO PT. WILL TAKE IT.  OTHERWISE
SHE WILL SPIT IT OUT AND NOT TAKE IT AT ALL.  SHE WANDERS ABOUT THE UNIT IN
HER W/C.  SHE IS UNABLE TO WALK ON HER OWN AS SHE IS TOO UNSTEADY ON HER FEET.

## 2019-07-24 NOTE — NUR
DEVEN BECAME COMBATIVE WITH STAFF, HITTING AND THROWING ITEMS AT THEM,
BECOMING DISRUPTIVE IN THE MILIEU. PRN GEODON AS ORDERED WAS GIVEN. PATIENT
CONTINUES TO BE BELIGERENT AND CONTINUES TO STATE 'I HATE YOU YOU BITCHES.'
WILL CONTINUE TO MONITOR MOOD AND BX.

## 2019-07-24 NOTE — NUR
Date of Admission: 07/03/19
Date of Activity Therapy Assessment: 07/06/19
Activity Goal: Increase self esteem and social engagement
Initial Goal: 1 Group activity/day
Weekly progress towards goal: Did not achieve goals
Group participation level: Minimal
Behaviors observed: Patient participates in afternoon groups when awake.
Participation is minimal d/t low cognition, though patient is usually
accepting of assistance. Agitation has decreased.
Plan: Offer 1:1 visits if patient sleeps through groups

## 2019-07-24 NOTE — NUR
Seen for follow up on [facility] unit. On regular diet w/ very sporadic meal and
supplement intakes. Often eating 5-25% of meals, 40% at breakfast today. Did
consume 100% Ensure pudding this AM. Will continue pudding (4 g protein), but
stop Ensure enlive d/t poor completion. Staff state "just wants to play in her
food," doesn't want to eat. Per SW, will need 24 hr care at home or explore [place]
placement more aggressively. Attempted to obtain food preferences, but limited
feedback from pt. States liking PB, cottage cheese. Plan: send PB w/ English
muffins, added cottage cheese w/ peaches at dinner, start Beneprotein modulars
into potatoes (6 g per pkt). Low nutrition risk as new interventions in place,
and likely limited nutrition progress in a hospice pt.

## 2019-07-24 NOTE — NUR
NURSES NOTE - THIS NURSE ASSUMED CARE AT APPROXIMATELLY 1900. UPON INITIAL
GREETING WITH PATIENT SHE INFLICTED A BRIGHT AFFECT AND STATED 'OH IM SO GLAD
YOUR BACK.' SHORT TERM MEMORY APPEARS TO BE INTACT IN SOME REGARD. PATIENT WAS
COOPERATIVE WITH MEDICATION COMPLIANCE, ADLS, ET SNACKS. WHILE GETTING
PATIENTS UP AND INTO BED AT , SHE BECAME DEFIANT AND COMBATIVE WITH STAFF
MAKING HI STATEMENTS AND ATTEMPTING TO ASSAULT STAFF. GEODON IM X1 WAS GIVEN
AS ORDERED ON THE MAR. SHE DENIED SI AND DEPRESSION ET ANXIETY AT TIME OF
ORGINALY ASSESSMENT, BUT EVALUATING FACIAL FEATURES AT TIME OF ESCALATION ARE
OPPOSITE AS BEFORE. SHE DID NOT REPORT ANY HEALTH CONCERNS WITH NO S/S OF
DISTRESS. NURSING WILL MAINTAIN ALL PRECAUTIONS TO ENSURE SAFETY AT ALL TIMES.

## 2019-07-25 VITALS — SYSTOLIC BLOOD PRESSURE: 127 MMHG | DIASTOLIC BLOOD PRESSURE: 55 MMHG

## 2019-07-25 VITALS — DIASTOLIC BLOOD PRESSURE: 58 MMHG | SYSTOLIC BLOOD PRESSURE: 126 MMHG

## 2019-07-25 LAB
BASOPHILS NFR BLD AUTO: 0.7 % (ref 0–2)
BILIRUB UR-MCNC: NEGATIVE MG/DL
COLOR UR: YELLOW
EOSINOPHIL NFR BLD: 1.7 % (ref 0–3)
ERYTHROCYTE [DISTWIDTH] IN BLOOD BY AUTOMATED COUNT: 16.5 % (ref 10.5–14.5)
GRANULOCYTES NFR BLD MANUAL: 62.6 % (ref 36–66)
HCT VFR BLD CALC: 36.4 % (ref 37–47)
HGB BLD-MCNC: 12.2 GM/DL (ref 12–15)
KETONES UR STRIP-MCNC: NEGATIVE MG/DL
LYMPHOCYTES NFR BLD AUTO: 28.3 % (ref 24–44)
MCH RBC QN AUTO: 30 PG (ref 26–34)
MCHC RBC AUTO-ENTMCNC: 33.5 G/DL (ref 28–37)
MCV RBC: 89.4 FL (ref 80–100)
MONOCYTES NFR BLD: 6.7 % (ref 1–8)
NEUTROPHILS # BLD: 4.6 THOU/UL (ref 1.4–8.2)
PLATELET # BLD: 346 THOU/UL (ref 150–400)
RBC # BLD AUTO: 4.07 MIL/UL (ref 4.2–5)
RBC # UR STRIP: NEGATIVE /UL
SP GR UR STRIP: >= 1.03 (ref 1–1.03)
URINE CLARITY: CLEAR
URINE GLUCOSE-RANDOM*: NEGATIVE
URINE LEUKOCYTES-REFLEX: NEGATIVE
URINE NITRITE-REFLEX: NEGATIVE
URINE PROTEIN (DIPSTICK): NEGATIVE
UROBILINOGEN UR STRIP-ACNC: 1 E.U./DL (ref 0.2–1)
WBC # BLD AUTO: 7.4 THOU/UL (ref 4–11)

## 2019-07-25 NOTE — NUR
PT ASLEEP IN W/C WITH LAP BELT IN DAY ROOM UPON ARRIVAL TO SHIFT. PT ASSISTED
TO BED. PT COMPLIANT WITH MEDS AND HS SNACK. BLUNTED AFFECT, GOOD EYE CONTACT
WHEN STAFF TALKING WITH PT. PT INTERACTED NON VERBALLY THIS SUSAN DURING MED
PASS AND SNACK. BLE EDEMA +4 CONTINUES.

## 2019-07-25 NOTE — NUR
HAS BEEN VISIBLE IN DAYROOM SITTING WITH PEERS SO FAR THIS SHIFT.
CONVERSATION RELEVENT TO TOPIC WITH NOTED WORD FINDING DIFFICULTIES WILL
BECOME FRUSTRATED/UPSET WITH THIS. SMILING AND APPRECIATIVE OF HELP OFFERD BY
STAFF  "OH THANK YOU SO MUCH" OR "THAT IS GOOD I LIKE THAT"TRANSFERS WITH SBA
X1-2-INCONTINENT OF SMALL AMOUNT OF URINE-OTHERWISE IS ABLE TO ALERT STAFF
WHEN NEEDING TO GO TO BATHROOM. DOES REPORT PAIN "ALL OVER" TYLENOL SCHEDULED
GIVEN PER ORDER WITH VERBALIZED FAIR RELIEF.

## 2019-07-25 NOTE — NUR
MALVIN and Dr. bustamante had a meeting with the family concerning referral to
[place] memory care [facility]. MALVIN explained that she will send a referral to
[facility] in [place] to see if pt can be accepted by Medicaid. Malvin explained
that she would like to speak with the pt son Montrell concerning [facility]. MALVIN left
a voicemail for Montrell, and provided contact information. MALVIN requested a phone
call back. MALVIN will follow-up with Sarah concerning d/c.

## 2019-07-25 NOTE — NUR
DEVEN REPORTED TO THIS NURSE THAT 'I JUST HURT ALL OVER.' THIS NURSE
ADMINISTERED MORPHINE AS ORDERED. WILL MONITOR FOR S/S OF PAIN. FACIAL
GRIMACING WAS NOTICED

## 2019-07-26 VITALS — SYSTOLIC BLOOD PRESSURE: 89 MMHG | DIASTOLIC BLOOD PRESSURE: 48 MMHG

## 2019-07-26 VITALS — SYSTOLIC BLOOD PRESSURE: 124 MMHG | DIASTOLIC BLOOD PRESSURE: 52 MMHG

## 2019-07-26 NOTE — NUR
JOY spoke with the pt son Montrell on a conference calls, and daughter Sarah
concerning applying for [place] Medicaid, and sending referrals to [place]
memory care facilities. SW mention that she will follow-up up from Montrell to see
if the [place] Medicaid was accepted.

## 2019-07-27 VITALS — SYSTOLIC BLOOD PRESSURE: 119 MMHG | DIASTOLIC BLOOD PRESSURE: 70 MMHG

## 2019-07-27 VITALS — DIASTOLIC BLOOD PRESSURE: 50 MMHG | SYSTOLIC BLOOD PRESSURE: 101 MMHG

## 2019-07-27 NOTE — NUR
LATE NOTE:  PATIIENT OBSERVED VERY LETHARGIC AT DINNER.  COULD NOT SIT UP
STRAIIGHT AT DINNER.  NURSE ASSISTED PATIENT WITH EATING; HOWEVER, PATIENT ATE
VERY LITTLE AND MOST OF MEATLOAF THAT WAS PUT IN HER MOUTH WAS SPIT OUT.
DRANK ONLY PART OF HER SUPPLEMENT.  SEE SUPPLEMENT CHARTING ON A SEPARATE
PAGE.

## 2019-07-27 NOTE — NUR
Care assumed of patient at 1915: Patient alert and oriented to person.
Patient disoriented to current location, time and situation.  Patient has been
resting in bed this shift.  Patient has been turning and changing positions
significantly when in bed, independently.  Patient has been in a new position
every 2 hours.  Patient has 1+ pitting edema present to bilateral lower
extremities.  Patient reports legs feel "tight".  Patient calm, pleasant and
cooperative.  Patient would occasionally smile at staff but was otherwise flat
and watching roomate.  Patient has a gray/pale skin tone to her face.  Patient
denies pain or discomfort.  Denies SI/HI/AH/VH.  No delusional or paranoia
behaviors observed.  Patient incontinent bladder x2 at this time.  Patient
took medication whole in applesauce without difficulties.  Patient offered HS
snack but declined.  Patient resting quietly in bed at this time.

## 2019-07-28 VITALS — DIASTOLIC BLOOD PRESSURE: 73 MMHG | SYSTOLIC BLOOD PRESSURE: 104 MMHG

## 2019-07-28 VITALS — DIASTOLIC BLOOD PRESSURE: 74 MMHG | SYSTOLIC BLOOD PRESSURE: 101 MMHG

## 2019-07-28 NOTE — NUR
THIS RN WAS ROUNDING ON PATIENTS AND PT CALLED THIS RN INTO ROOM. SHE ASKED
'AM I DYING, DO YOU THINK IM DYING? IM SCARED TO EVEN GO TO SLEEP BECAUSE IM
SCARED I MIGHT NOT WAKE UP.' THIS NURSE OFFERED REASSURANCE TO PT. SHE ALSO
APPEARED TO HAVE FACIAL GRIMACING AND REPORTED 'I HURT ALL OVER I JUST DONT
KNOW WHAT TO DO, I FEEL SO AWFUL.' THIS NURE OFFERED PRN PAIN MEDICATION WHICH
WAS GIVEN TO PATIENT. CURRENTLY SHE IS IN BED WITH EYES CLOSED. RR EVEN AND
UNLABORED. NO S/S OF DISTRESS. WILL CONTINUE TO MONITOR.

## 2019-07-28 NOTE — NUR
THIS NURSE REASSESSED DEVEN AFTER SHE AWOKE WANTING TO GO OUT TO THE DAY ROOM.
SHE WAS ABLE TO SPEAK WITH THIS RN MAKING COMPLETE SENTENCES EVEN IF THEY
DIDNT FIT THE SITUATION AT HAND. SHE IS SMILING AND LAUGHING AND ASKING
QUESTIONS AND APPEARS WITH A BRIGHT AFFECT. VSS, SHE IS CALM AND COOPERATIVE.
WILL CONTINUE TO MONITOR.

## 2019-07-28 NOTE — NUR
ASSUMED CARE AT 0700 THIS MORNING.  PT.  CONTINUES TO BE CONFUSED,
DISORIENTED.  SHE COMPLAINS OF BEING HUNGRY BUT WHEN THE FOOD ARRIVES, SHE
STATES SHE CANNOT EAT THAT STUFF.  SHE WILL PLAY IN HER FOOD AND AT ONE TIMES
ATTEMPTED TO THROW HER FOOD, BUT WAS STOPPED BY STAFF.  HER MEDICATIONS WERE
CRUSHED AND PUT IN PUDDING OR APPLESAUCE.  SHE INITIALLY WANTED TO PUSH THE
NURSES HAND AWAY.  BUT WITH PERSISTANCE, SHE WILL TAKE HER MEDICATIONS.  SHE
SAT IN THE ROOM DURING GROUPS BUT WAS NOT A PARTICIPANT IN THE GROUPS.  SHE
ASKED STAFF TO LAY HER DOWN AFTER LUNCH, BUT GOT RIGHT OUT OF BED AGAIN WHEN
STAFF LAYED HER DOWN.  NO S/S HI/SI OR AVH NOTED TODAY.  SHE IS ALERT AND
ORIENTED TIMES 1.

## 2019-07-28 NOTE — NUR
THIS NURSE ENTERED PATIENTS ROOM AS PATIENT SAW THIS NURSE WALKING BY. SHE WAS
IN BED WITH EYES MOSTLY CLOSED BUT COULD IDENTIFY WHO THIS NURSE BY NAME. SHE
STATED TO THIS NURSE THAT SHE FELT 'HORRIBLE AND I DONT KNOW IF ILL LIVE
THROUGH THE NIGHT, I LOVE YOU VERY MUCH.' SHE FURTHER REPORTED 'IM SCARED.'
AND ASKED THIS NURSE TO HOLD HER HAND FOR AN EXTENDED TIME. PT THANKED THIS
NURSE AND ASSURED HER I WOULD CHECK IN ON HER THROUGHOUT THE NIGHT.

## 2019-07-29 VITALS — DIASTOLIC BLOOD PRESSURE: 81 MMHG | SYSTOLIC BLOOD PRESSURE: 143 MMHG

## 2019-07-29 NOTE — NUR
PT ASLEEP IN BED UPON ARRIVAL TO SHIFT. PT EASILY AWAKENED AND SMILING TELLING
STAFF THAT THEY ARE BEAUTIFUL AND SMILING. PT COMPLIANT WITH HS MEDS PUDDING
AND WATER. NO C/O PAIN OR NAUSEA. PT AWAKENED AT 2130 AND STATED SHE NO LONGER
WANTED TO BE STUCK HERE. PT ASSISTED TO W/C AND TAKEN TO DAY ROOM TO SIT [facility]
STAFF. PT ASLEEP IN CHAIR BUT SITTING WITH STAFF. LAP BELT ON W/C.

## 2019-07-29 NOTE — NUR
PT IN DAY ROOM IN W/C WITH LAP BELT, PT RESTLESS, SITTING AND STANDING,
TAKING OFF BELT, REQUESTED
TO USE RESTROOM. BSC PROVIDED, PT REQUESTED TO GOTO BED. PT WAS FOCUSED ON
FEMALE TECH STATING SHE WAS OUT TO CAUSE HER HARM AND BE MEAN AND NOT TO LEAVE
HER ALONE. PT REDIRECTED AND SUPPORTED AND PT FELL ASLEEP. COMPLIANT WITH HS
MEDS. GOOD EYE CONTACT, CLEAR SPEACH, FLAT AFFECT.

## 2019-07-29 NOTE — NUR
ON CALL HOSPITALIST NOTIFIED OF NURSE MANAGER REQUEST TO REVIEW CHRONIC EDEMA
CONCERNS
AND MEDICATIONS. ALSO NOTIFIED PT VOMITTED ON DAY SHIFT. ABD DISTENDED.

## 2019-07-29 NOTE — NUR
BECAME AGITATED AND IRRITABLE AROUND 1200-DISTRACTION,PRN
MEDS,TOILETING,REPOSITIONING ATTEMPTED BUT AGITATION ESCLATES INTO THROWING
ITEMS OFF OF LUNCH TRAY AT PEERS,YELLING-STRIKING OUT AT STAFF-DOES STATE HER
LEGS HURT-MINIMAL RELIEF WITH NORCO 5/325 1/2 TAB GIVEN AT 0900. MORPHINE
SULFATE 10MG GIVEN SUBLINGUAL HOWEVER PT BITING AT SYRINGE,SPITTING OUT LIQUID
MED-GEODON 10MG IM GIVEN IN LEFT DELTOID AT 1300-FEET,ANKLES LE SWOLLEN APPROX
2-3 PEDALEDEMA-HARD TENSE-NON-PITTING-NO REDNESS/WARMTH,INDURATION OBSERVED

## 2019-07-29 NOTE — NUR
DID REST IN ROOM THIS PM FROM APPROX. [id number] STOMACH
HURTING-ABDOMEN IS NOTED TO BE DISTENDED-BS HYPERACTIVE-LARGE SOFT BM IN
COMMODE -FREQUENT FLATULANCE-DID EAT WELL AT SUPPER BUT AFTER MEAL VOMITTED
SMALL AMOUNT UNDIGESTED FOOD-ZOFRAN 4MG ODT GIVEN-BS HYPERACTIVE X4-

## 2019-07-30 VITALS — SYSTOLIC BLOOD PRESSURE: 81 MMHG | DIASTOLIC BLOOD PRESSURE: 49 MMHG

## 2019-07-30 VITALS — DIASTOLIC BLOOD PRESSURE: 64 MMHG | SYSTOLIC BLOOD PRESSURE: 140 MMHG

## 2019-07-30 VITALS — SYSTOLIC BLOOD PRESSURE: 140 MMHG | DIASTOLIC BLOOD PRESSURE: 64 MMHG

## 2019-07-30 VITALS — DIASTOLIC BLOOD PRESSURE: 49 MMHG | SYSTOLIC BLOOD PRESSURE: 81 MMHG

## 2019-07-30 NOTE — NUR
ASSUMED PATIENT CARE AT 0700.  PATIENT WAS AWAKE IN BED AT THAT TIME.  NURSE
RESPONDED TO PATIENT'S BEDSIDE, PATIENT VERY SWEET AND CALM.  ASSISTED UP TO
W/C TO DINING ROOM, WHERE SHE SAT QUIETLY AWAITING BREAKFAST.  MEDICATIONS ARE
CRUSHED, GIVEN IN PUDDING TODAY.  COMPLIANT WITH MEDICATIONS WITH SIPS OF
WATER IN BETWEEN BITES OF PUDDING/MEDICATIONS.  POTASSIUM CHLORIDE CANNOT BE
CRUSHED, HOWEVER, PATIENT SWALLOWED IN PUDDING WITHOUT DIFFICULTY.  FLAT
AFFECT, CALM MOOD, NO BEHAVIORS DISPLAYED.

## 2019-07-30 NOTE — NUR
RECEIVED REPORT FROM OFFGOING DAY NURSE, ASSUMED CARE @ 1900 ON 07/30/19.  IN
BED EYES CLOSED, RESPIRATIONS EVEN AND UNLABORED.  HRRR, LUNGS CTA ALL FIELDS,
ABD NORMOACTIVE X 4 Q.

## 2019-07-31 VITALS — SYSTOLIC BLOOD PRESSURE: 98 MMHG | DIASTOLIC BLOOD PRESSURE: 48 MMHG

## 2019-07-31 VITALS — SYSTOLIC BLOOD PRESSURE: 88 MMHG | DIASTOLIC BLOOD PRESSURE: 53 MMHG

## 2019-07-31 VITALS — SYSTOLIC BLOOD PRESSURE: 81 MMHG | DIASTOLIC BLOOD PRESSURE: 49 MMHG

## 2019-07-31 VITALS — DIASTOLIC BLOOD PRESSURE: 49 MMHG | SYSTOLIC BLOOD PRESSURE: 81 MMHG

## 2019-07-31 NOTE — NUR
Nutrition followup: pt continues on regular diet with po intake 0-25% of meals
typically. Nsg reports 50% this am however. Pt is receiving ensure enlive and
ensure pudding BID. Intake is also sporadic, refuse to 25%. Beneprotein being
offered at dinner in potatoes. Providing food preferences daily. Confused and
unable to interview. Current weight down 2# from admit, not significant.
Continue low nutrition risk due to hospice pt and interventions in place.

## 2019-07-31 NOTE — NUR
PT ASLEEP IN W/C IN DAY ROOM. WHEN PT ASSISTED TO BED SHE STATED SHE DID NOT
WANT TO BE THERE AND WAS ATTEMPTING TO GET OUT OF BED. PT RETURNED TO W/C IN
DAY ROOM AND PT RETURNED TO SLEEPING. W/C LAP BELT IN USE. PT COMPLIANT WITH
MEDS AND ADL CARES. SPEECH CLEAR, GOOD EYE CONTACT. CONTINUES WITH BLE EDEMA.

## 2019-07-31 NOTE — NUR
JOY spoke with Deanna from [facility] concerning admission.
Deanna mention that she in running pt assets to see if she qualify, and will let
me know if pt will be acceted into [facility] on August 1, 2019. JOY will follow-up
with Deanna on tommorrow.

## 2019-07-31 NOTE — NUR
ASSUMED CARE AT 0700 THIS MORNING.  PT. UP ON UNIT FOR MEALS, GROUPS,
MEDICATIONS.  MEDS ARE CRUSHED AND PLACED IN APPLESAUCE OR PUDDING.  SHE HAS
BEEN COOPERATIVE WITH THIS.  SHE CONTINUES TO TELL STAFF SHE DOES NOT WANT TO
EAT THE FOOD AS IT DOES NOT TASTE GOOD TO HER, ENCLUDING ICE CREAMS, PUDDINGS,
COFFEE, ETC.  SHE TAKES ABOUT 2 OR 3 BITES OF HER FOOD AND EITHER WANTS TO
THROW IT OR PUSH IT AWAY.  STAFF ATTEMPTS TO HELP HER WITH NO LUCK.  SHE TRIES
TO TELL STAFF SOMETHING, STARTS A SENTENCE BUT CANNOT FINISH IT.  HER THOUGHTS
TRAIL OFF AND ONTO ANOTHER SUBJECT.  SHE IS ASKED REPEATEDLY BY STAFF TO HELP
THEM UNDERSTAND WHAT IT IS SHE WANTS THEM TO KNOW, BUT SHE CANNOT COMPLETE A
THOUGHT.  HER SPEECH IS RELATIVELY CLEAR BUT RAMBLES.  SHE CONTINUES TO BE
CONFUSED, SOMEWHAT IRRITABLE.  SHE WAS GIVEN PRN MORPHINE TODAY WHEN SHE WAS
BECOMING AGITATED.  SHE SHOOK HER HEAD YES TO THE QUESTION OF IF SHE WAS IN
PAIN AND PAIN MED. WAS GIVEN TO HER.  SHE THEN BECAME MORE PLEASANT AS IT
WORKED WITH NO NOTIBLE AGITATION.

## 2019-08-01 VITALS — DIASTOLIC BLOOD PRESSURE: 78 MMHG | SYSTOLIC BLOOD PRESSURE: 120 MMHG

## 2019-08-01 VITALS — SYSTOLIC BLOOD PRESSURE: 122 MMHG | DIASTOLIC BLOOD PRESSURE: 64 MMHG

## 2019-08-01 NOTE — NUR
PT EATING BREAKFAST AND STATED SHE DON'T LIKE IT OR DON'T WANT IT, AS SHE IS
EATING. PT NEEDING MEDS CRUSHED DUE TO CHEWING UP PILLS WHOLE. PT ENCOURAGED
TO DRINK LIQUIDS.

## 2019-08-01 NOTE — NUR
Date of Admission: 07/03/19
Date of Activity Therapy Assessment: 07/06/19
Activity Goal: Increase self esteem and social engagement
Initial Goal: 1 group per day or 1:1 if unable to engage in group
Weekly progress towards goal: On track
Group participation level: Minimal
Behaviors observed: Patient continues to contribute minimally to groups, but
is able to attend one group per day on average. CTRS supplies patient with
objects for fidgeting to decrease restlessness.
Plan: No change towards goal

## 2019-08-01 NOTE — NUR
PT UP IN W/C WITH HEMANT HARO. PT STATED SHE WAS HUNGRY THIS AM. PT WANTING TO
KNOW WHEN IS BREAKFAST. PT UP WITH ASSIST OF 1-2. PT HAS JIM HOSE ON
BILATERAL. PT ALSO HAS EDEMA TO PEDAL AND ANKLE. PT LUNGS CLEAR.

## 2019-08-02 VITALS — DIASTOLIC BLOOD PRESSURE: 110 MMHG | SYSTOLIC BLOOD PRESSURE: 133 MMHG

## 2019-08-02 VITALS — SYSTOLIC BLOOD PRESSURE: 105 MMHG | DIASTOLIC BLOOD PRESSURE: 56 MMHG

## 2019-08-02 NOTE — NUR
Care assumed at 1915: Patient alert and oriented to person.  Patient confused
and forgetful.  Pleasant, cooperative, calm.  Patient having delusional
thoughts this evening.  Word salad present at times.  Patient patting a pillow
next to her saying it was a "cute puppy".  Patient holding a spoon in her hand
talking about what to do with this hammer.  Patient took medications crushed
without difficulty.  Patient reports that she does not care for the liquid
anti-acid medication that was changed.  Patient had small liquid bowel
movement.  Using bedside commode for toileting needs.  Patient fussy and
almost fearful at times.  Provided comfort by speaking with her and holding
her hand.  Patient was assisted to bed and has been resting quietly.

## 2019-08-02 NOTE — NUR
REMAINS CONFUSED ORIENTED TO NAME ONLY.  PLEASANT AND COOPERATIVE MOST OF THE
TIME.  POOR APPETITE.  PROPELLS SELF IN WHEELCHAIR LAP BELT ON FOR SAFETY.
FEET REMAIN EDEMATOUS 2+ EDEMA.  VISITS WITH OTHER PATIENTS IN DINING ROOM.

## 2019-08-03 VITALS — SYSTOLIC BLOOD PRESSURE: 126 MMHG | DIASTOLIC BLOOD PRESSURE: 71 MMHG

## 2019-08-03 VITALS — SYSTOLIC BLOOD PRESSURE: 133 MMHG | DIASTOLIC BLOOD PRESSURE: 113 MMHG

## 2019-08-03 NOTE — NUR
NURSES NOTE - DURING INTIAL GREETING PT WAS IN THE DAY ROOMA PPEARING WITH A
FLAT SAD AFFECT, MINIMALLY INTERACTING WITH OTHER PEERS. SHE WAS ABLE TO
RECALL WHO THIS NURSE WAS. BUT IS ALERT AND ORIENTED X1-2. AT APPROXIMATELY
2100 SHE APPEARED TO BECOME INCREASINGLY CONFUSED AND STATED TO STAFF 'I HATE
YOU YOURE THE WORST.' HER MOOD BECAME LABILE AND THEN MADE STATEMENTS SUCH AS
'NO I LOVE YOU.' WHEN MAKING STATEMENTS TO THIS RN SHE IS SPEAKING A WORD
SALAD. SHE DID NOT REPORT ANY MEDICAL CONCERNS, TOOK MEDICATION AS PRESCRIBED
CRUSHED. SHE HAS BILATERAL EDEMATOUS LOWER LEGS. NO PEDAL PULSES WERE
PALPABLE. WILL CONTINUE TO MONITOR AS NEEDED.

## 2019-08-03 NOTE — NUR
PT UP WITH ASSISTANCE X1-2 PERSON FROM BED TO W/C. PT HITTING BED RAIL AND
AGGITATED TO GET UP. PT HAS LAP TAHIR FOR SECURITY IN W/C. PT DOESN'T TRY TO
GET OUT OF W/C. PT LUNGS CLEAR, DIMINISHED TO BASES. PT NEEDS ENCOURAGEMENT TO
EAT FOOD.

## 2019-08-03 NOTE — NUR
NURSES NOTE - DEVEN IS ALERT AND ORIENTED X1-2. SHE IS ABLE TO RECALL THIS
NURSE AND RECOGNIZE THIS RN BY NAME. UPON ASSUMING CARE OF PATIENT, SHE WAS
OBSERVED IN THE DAY ROOM SPREADING WATER AND OTHER DRINKS AROUND THE TABLE.
SHE WAS ATTEMPTING TO CLEAN IT UP, BUT STATED 'I FORGOT HOW.' SHE NEEDED
SEVERAL PROMPTS TO MAINTAIN BX. SHE APPEARS WITH A CONFUSED AND FLAT AFFECT,
BUT WILL SMILE WHEN ATTENTION IS GIVEN TO PATIENT. SHE TAKES HER MEDS CRUSHED
AND IN LIQUID FORM. SHE REPORTS SHE DOES NOT LIKE, BUT IS COMPLIANT WITH ALL
MEDICATIONS AS ORDERED. SHE DENIED MEDICAL CONCERNS WITH THIS NURSE BUT THIS
NURSE OBSERVED PATIENT DISCUSSING WITH OTHER PATIENTS THAT SHE IS SCARED TO
DIE. SHE APPEARS PRE OCCUPIED IN DEATH ET DYING. NURSING WILL MAINTAIN ALL
PRECAUTIONS TO ENSURE SAFETY AT ALL TIMES.

## 2019-08-04 VITALS — SYSTOLIC BLOOD PRESSURE: 136 MMHG | DIASTOLIC BLOOD PRESSURE: 86 MMHG

## 2019-08-04 VITALS — DIASTOLIC BLOOD PRESSURE: 73 MMHG | SYSTOLIC BLOOD PRESSURE: 114 MMHG

## 2019-08-04 NOTE — NUR
ASSUMED CARE OF PT APPROX 0715, ALERT TO SELF ONLY AT THIS TIME, REFUSES MEDS
YET WHEN GENTLY COERCED TAKES MOST OF THEM WHOLE W/PUDDING, SEEMS TO AMB
STEADY AT THIS TIME, SEE SEPARATE INTERVENTION FOR ASSESSMENTS. WILL CONTINUE
TO MONITOR

## 2019-08-05 VITALS — DIASTOLIC BLOOD PRESSURE: 86 MMHG | SYSTOLIC BLOOD PRESSURE: 109 MMHG

## 2019-08-05 VITALS — SYSTOLIC BLOOD PRESSURE: 99 MMHG | DIASTOLIC BLOOD PRESSURE: 66 MMHG

## 2019-08-05 VITALS — DIASTOLIC BLOOD PRESSURE: 86 MMHG | SYSTOLIC BLOOD PRESSURE: 136 MMHG

## 2019-08-05 NOTE — NUR
PT OUT IN DAY AREA AT BEGINNING OF EVENING. RESTLESS, AND CONFUSED. RAMBLING
MOSTLY INCOHERANT ATTEMPTS AT CONVERSATION. TOOK HS MEDS WITH ENCOURAGEMENT,
AND ESCORTED TO ROOM AFTER SNACKS. CONTINUED RESTLESSNESS, WITH PT ATTEMPTING
TO GET OUT OF BED. AFTER NUMEROUS ATTEMPTS TO HELP HER SETTLE, ASSISTED HER TO
WC, AND SAT WITH PT IN DAY ROOM, LISTENING TO HER RAMBLE. OFFERED SNACK,
REFUSED BY PT. FINALLY BECAME DROWSY AND RETURNED PT TO BED AROUND MIDNIGHT,
AND HAS SLEPT TO THIS POINT.

## 2019-08-05 NOTE — NUR
ASSUMED CARE AT 0700 THIS MORNING.  PT. UP ON THE UNIT FOR MEALS.  SHE WAS
STILL EATING AND/OR PLAYING IN HER FOOD AS GROUP WAS BEING CONDUCTED.  SHE
TAKES HER MEDICATIONS CRUSHED AND IN PUDDING OR APPLESAUCE.  SHE IS
COOPERATIVE WITH TAKING HER MEDICATIONS.  SHE IS COOPERATIVE WITH STAFF. SHE
REQUIRES HELP GOING TO THE BATHROOM.  HER SPEACH IS TANGENTIAL.  SHE STARTS
ONE TRAIN OF THOUGHT AND FINISHES ON ANOTHER SUBJECT ENTIRELY.  SHE IS IN A
W/C WITH A LAP TAHIR.

## 2019-08-06 VITALS — SYSTOLIC BLOOD PRESSURE: 105 MMHG | DIASTOLIC BLOOD PRESSURE: 64 MMHG

## 2019-08-06 VITALS — DIASTOLIC BLOOD PRESSURE: 86 MMHG | SYSTOLIC BLOOD PRESSURE: 149 MMHG

## 2019-08-06 NOTE — NUR
RECEIVED REPORT FROM OFFGOING DAY NURSE, ASSUMED CARE @ 19:15.  IN BED WITH
EYES CLOSED, RESPIRATIONS EVEN AND UNLABORED.  AWAKENED TO TOUCH AND TOOK HS
MEDS CRUSHED IN PUDDING.  10MG OF MORPHINE SULFATE PROVIDED @ 10:10.
TOLERATED WELL. REPOSITIONED AND PT RETURNED TO SLEEP. BED IN LOW POSITION,
ALARM SET, WILL CONTINUE TO MONITOR Q 12 MINUTES FOR PATIENT SAFETY.

## 2019-08-06 NOTE — NUR
ASSUMED PATIENT CARE AT 0700.  PATIENT ASSISTED UP TIMES ONE STAFF.  ZYPREXA
NOW ORDERED IN LIQUID FORM, R/T CHEWING TABLETS.  PATIENT IS UNABLE TO DRINK
ZYPREXA LIQUID 40 MG.  FIRST MIXED WITH THICKENED APPLE JUICE, TIMES TWO. V
DRANK SMALL AMOUNT, REFUSED TO DRINK THE REST.  NURSE ADDED CHOCOLATE ENSURE,
PATIENT STILL STATING THAT SHE CANNOT DRINK IT.  HAS ONLY HAD A SMALL AMOUNT.
NURSE WITH PATIENT WITH HER MEDICATION, ENCOURAGING HER TO DRINK AND TAKE
SIPS OF WATER IN BETWEEN.  AT FIRST, BUT CURRENTLY REFUSING TO DRINK EITHER
ONE.

## 2019-08-06 NOTE — NUR
PATIENT STATED THAT HURT ALL OVER; ADMINISTERED MORPHINE 10MG FOR PAIN.  ORDER
IS FOR 10MG FOR PAIN Q 1 HOUR FOR PAIN.  MONITOR CLOSELY FOR PAIN.

## 2019-08-06 NOTE — NUR
LATE NOTE:  NOTE MADE PREVIOUSLY AT 0730.  HOWEVER, INCORRECT INFORMATION ON
SOME OF THE NOTE.  PATIENT WAS ASSISTED UP TIMES ONE TO DR FOR BREAKFAST.
DOES NOT LIKE THE NEW LIQUID DOSE OF ZOFRAN, DRANK ABOUT 3/4.

## 2019-08-06 NOTE — NUR
RECEIVED REPORT FROM OFFGOING DAY NURSE, ASSUMED CARE @ 1900.IN WHEELCHAIR IN
DAY ROOM.  PROPELLING HERSELF IN W/C THROUGHOUT THE ROOM.  A&OX1-2.  CONFUSED
BUT PLEASANT. TOOK MEDICATIONS CRUSHED IN PUDDING. WILL CONTINUE TO MONITOR Q
12 MINUTES FOR PATIENT SAFETY.

## 2019-08-07 VITALS — SYSTOLIC BLOOD PRESSURE: 101 MMHG | DIASTOLIC BLOOD PRESSURE: 58 MMHG

## 2019-08-07 VITALS — DIASTOLIC BLOOD PRESSURE: 58 MMHG | SYSTOLIC BLOOD PRESSURE: 101 MMHG

## 2019-08-07 VITALS — DIASTOLIC BLOOD PRESSURE: 49 MMHG | SYSTOLIC BLOOD PRESSURE: 86 MMHG

## 2019-08-07 NOTE — NUR
assumed pt care report received from nurse  pt is alert disoriented x4.
screaming " help me"/ sitting in wheelchair at e table, agitated, anxious,
restless.  denies pain. vss. o2 saturation is 86% on ra. oxygen was monitored
again during this shift and found to be in the 70s. routine oxygen support of
2 l nc was given temporary. o2 went up in the 80 s again.  pt refuses to eat
and took about half of her medicine in apple sauce. pt remains in wheelchair
with chair belt on. pt assisted with care as needed. was given prn lorazepam
and morphine as ordered. since then pt fell asleep in her wheelchair in
activity room. pt was then transferred to her bed and positioned comfortably
to sleep. will continue to monitor

## 2019-08-07 NOTE — NUR
MORPHINE SULFATE GIVEN FOR PAIN, @ 04:45.  CONTINUES TO BE AGITATED. SLEPT 9.6
HOURS.  TOILETED, TRANSFERRED TO BS AND THEN TO W/C AND PROPELLED TO THE DAY
ROOM.

## 2019-08-07 NOTE — NUR
Date of Admission: 07/03/19
Date of Activity Therapy Assessment: 07/06/19
Activity Goal: 1 group or 1:1 per day
Initial Goal: Increase self esteem and social engagement
Weekly progress towards goal: On track
Group participation level: Minimal
Behaviors observed: Passive participation in most groups. Continues to speak in
word salad. Ocassional agitation, but redirectable with removal of stimulation.
Plan: No change towards goal

## 2019-08-07 NOTE — NUR
Nutrition follow up: PO intake remains unchanged week to week. No progress,
possibly even lower PO the last few days - 1 week. Averaging < 10% the last 3
days with 5%, 20%, occasionally 40% intake, but mostly meal refusal. Consuming
5-25% of 1-3 supplements/day, but refusing more often the last 2 days. Will
attempt to switch/decrease supplement frequency. Will d/c Ensure pudding, keep
Ensure Enlive daily at breakfast and add Magic Cup to dinner. Pt continues to
receive Beneprotein packet on mashed potato days per supplement orders. Last
BM 8/3. Pt not seen d/t ongoing confusion. Continue low nutrition risk given
pt situation of being hospice, new trial of nutrition interventions in place.

## 2019-08-08 VITALS — DIASTOLIC BLOOD PRESSURE: 50 MMHG | SYSTOLIC BLOOD PRESSURE: 93 MMHG

## 2019-08-08 VITALS — DIASTOLIC BLOOD PRESSURE: 60 MMHG | SYSTOLIC BLOOD PRESSURE: 94 MMHG

## 2019-08-08 VITALS — SYSTOLIC BLOOD PRESSURE: 94 MMHG | DIASTOLIC BLOOD PRESSURE: 60 MMHG

## 2019-08-08 VITALS — DIASTOLIC BLOOD PRESSURE: 45 MMHG | SYSTOLIC BLOOD PRESSURE: 96 MMHG

## 2019-08-08 NOTE — NUR
NURSES NOTE - PT AWOKE SCREAMING AND REACHING IN THE AIR, REPEATING 'HELP
ME IM SCARED, HELP ME!' ATIVAN INTENSOL GIVEN AS ORDERED, LINENS CHANGED, AND
PT CLEANED. WILL CONTINUE TO MONITOR FOR ANXIETY ET PAIN.

## 2019-08-08 NOTE — NUR
RAINER HERE TO SEE HER MOTHER. PLACED OXYGEN 2L NC ON FOR COMFORT, HANDS ARE
NOT AS COLD AS EARLIER. PT MOVED HEAD WHEN HEARD HER DAUGHTERS VOICE.

## 2019-08-08 NOTE — NUR
JOY left a voicemail with pt daughter Sarah concerning her mother health
condition deterioating. JOY explained that her mother needs to be put on
hospice immediately. JOY provided contact information inresquested a call back.

## 2019-08-08 NOTE — NUR
PT HAS BEEN IN BED ALL DAY, TURNED AND CLEANED EVERY 2 HOURS. PT WAS DENIED
FOR HOSPICE AT THIS TIME. PT FINGERS ARE VERY COLD AND OXYGEN ON ROOM AIR
BETWEEN 88-94%. PT CRIES OUT PERIODICALLY.

## 2019-08-08 NOTE — NUR
PT STILL ASLEEP THIS AM, PT HAS CRACKLES TO UPPER LOBES, IRREGULAR HEART BEAT.
NO MOTTLING NOTED TO LOWER EXT. PT APPEARS COMFORTABLE, NO SIGNS OF DISTRESS.

## 2019-08-08 NOTE — NUR
NURSES NOTE - THIS NURSE ASSUMED CARE OF PATIENT AT APPROXIMATELY 1915. SHE
HAS BEEN IN BED THROUGHOUT THE SHIFT WITH EYES CLOSED, RR UNEVEN AND LABORED
WITH DIMINISHED LUNG SOUNDS. SHE ALSO APPEARS TO BE USING ACCESSORY MUSCLES TO
COMPLETE RESIPIRATIONS. HER EYES HAVE BEEN CLOSED, THROUGH THE EVENING. AT
BEGINNING OF SHIFT VITAL SIGNS HOVERED 70'S/40'S, 14-15 RR, PULSES 40-50. ALL
EXTREMITIES COLD TO THE TOUCH, AND EDEMATOUS BILATERAL LEGS. 02 SATURATION IS
UNEVEN AND APPEARS UNSTABLE, THIS NURSE TOOK O2 SAT AT APPROXIMATELY 2030
SATURATION WAS 86%. THIS NURSE APPLIED 02 VIA NC AT 2L TO ASSIST IN COMFORT.
RECHECKING OF 02 AT APPROXIMATELY 2300 WAS 94%. 02 WAS TAKEN OFF TO ALLOW
PATIENT TO REST. PT WAS UNRESPONSIVE TO MY VOICE OR TOUCH THROUGH THE EVENING.
THIS NURSE REASSESED PATIENT AT 0330 AND PATIENT DID LIFT EYE LIDS TO MY
VOICE. NO S/S OF DISTRESS. THIS NURSE ENSURED ALL EXTREMITIES WERE COVERED AND
ENSURED PATIENT WAS AS COMFORTABLE AS POSSIBLE. WILL CONT TO OBSERVE FOR
PAIN/ANXIETY/SOA.

## 2019-08-08 NOTE — NUR
PT ABLE TO TAKE MEDS CRUSHED IN PUDDING. PT HAD HALF OF THE PUDDING AND FLUIDS
OFFERED. PT SWALLOWED WITHOUT COUGHING. WAITING FOR HOSPICE TO SEE PT.

## 2019-08-08 NOTE — NUR
SW called in spoke with pt son Montrell concerning his mother health. SW mention
that we hav reached out to  Hospice. JOY explained that Dr. Almonte, and SW
has not been successful with reaching his sister. SW mention that this is the
time that the family come in visit with the pt. SW will follow-up with the
family.

## 2019-08-08 NOTE — NUR
PT AWAKE AND YELLING OUT. PT UNABLE TO TELL THIS NURSE IF HAVING ANY PAIN. PT
STILL NOT OPENING EYES.

## 2019-08-08 NOTE — NUR
SW left a voicemail for pt son Montrell Terrell concerning his mother health. SW
provided contact information in requested a call back. JOY will follow-up with
pt on tomorrow, August 8, 2019.

## 2019-08-08 NOTE — NUR
PT RESTING IN BED UPON ARRIVAL TO SHIFT. DAUGHTER AT BEDSIDE PT WAS SLEEPING.
PT YELLING AND GROANING WITH ADL CARE SO PRN FOR PAIN RELIEF PROVIDED. PTS
GRANDCHILDREN ALSO VISITED. PT STARTED GROANING AND RESTLESS PRN ATIVAN
PROVIDED, WITH RELIEF. PT COMPLIANT [facility] HS MEDS.  VISITED WITH DAUGHTER AND
MEDICATIONS WERE DCD AFTER VISIT.

## 2019-08-09 NOTE — NUR
Patient Name: DEVEN BLANCAS
Admission Date: 07/03/19
DISCHARGE PLAN: Pt will be d/c to  Hospice
Care Assessment: Pt was assessed by Dr. Almonte, and diagnosed with Majpr
Neurocognitive Disorder with Failure Thrive.
Level II Assessment: None
Transportation: Pt will be transported by [facility] with a
stretcher and O2.
Special Instructions/Notes: Pt will need hospice with comfort care, and
pallative care.
 
DISCHARGE TO FACILITY: Hospice
Facility:  Hospice     Phone:[phone]   Fax:
Address: [address] [place] [id number]
Contact Name: Jaclyn   Phone: [phone]
PCP: ANDRE
Psychiatrist:

## 2019-08-09 NOTE — NUR
PATIENT DISCHARGED VIA TRANSPORT TO [facility]. DAUGHTER WITH HER -
ALL POSSESSIONS SENT WITH PATIENT AND VERIFIED BY DAUGHTER. ATIVAN 2 MG
ADMINISTERED AT 1815 PRIOR TO BEING SENT TO FACILITY. PATIENT UNABLE TO OBTAIN
VITALS - SEDATED BUT RESTLESS. PATIENT SENT ON 2L OF OXYGEN VIA NASAL CANULLA.
ALL PAPERWORK SIGNED AND REVIEWED.

## 2019-08-09 NOTE — NUR
JOY spoke with Jaclyn concerning pt been accepted into  Hospice. Pt will need
to be transported by stretcher. Pt will be d/c  on today at 1830. JOY has
schedule transportation.
 
 
JOY called in spoke with pt daughter concerning d/c to hospice care.

## 2019-08-11 NOTE — D
[facility]
[address]
[place]   [address]                     DISCHARGE SUMMARY             
_______________________________________________________________________________
 
Name:       DEVEN BLANCAS                  Room #:         [address]B      [facility] [place]  
M.R.#:      [id number]                       Account #:      [id number]  
Admission:  07/03/19    Attend Phys:    Eduardo Almonte DO
Discharge:  08/09/19    Date of Birth:  [birth year]  
                                                          Report #: [id number]
                                                                    [id number]   
_______________________________________________________________________________
THIS REPORT FOR:   //name//                          
 
CC: Eduardo Seaman
 
DATE OF SERVICE:  08/09/2019
 
 
ATTENDING:  Eduardo Almonte DO.
 
MEDICAL CONSULTANT AT THE TIME OF DISCHARGE:  Eduardo Quintero MD.
 
DISCHARGE DIAGNOSES:  Major neurocognitive disorder, congestive heart failure,
current decompensation.
 
DISPOSITION:  The patient was discharged to [facility].  The
patient was an excellent candidate for end of life care.  So, I believe her life
expectancy is only a number of days.  The patient is currently n.p.o. not able
to swallow.
 
REASON FOR ADMISSION:  She was initially sent from [email or web address] in [facility] with disorganization, psychosis including agitated and aggressive behavior.
 
HOSPITAL COURSE:  The patient was admitted to Geriatric Psych Unit.  Initially,
we did have some challenges with sundowning. The main delay of discharge was
inability to get 
another nursing facility to take her. The patient declined considerably in her
functional level throughout. 
  
 
In general, laboratories this admission last saw Hematology on
07/25/2019 H and H of 0.2 and 36.4, white count 7.4, platelets 346.  Coags last
INR was 1.3, July 8, but later Coumadin was discontinued.  Electrolytes on
07/03/2019, sodium 137, potassium 3.9, chloride 101, bicarbonate 33, BUN 18,
creatinine 0.8, estimated GFR 69, calcium 9.2.  Urine was clean.  She did have
an episode of emesis but there was positive occult blood.  She was treated with
high dose PPI throughout the admission.
 
MENTAL STATUS EXAMINATION:  Not possible.  Other than the patient is essentially
sleeping, minimally arousable, minimally responsive, felt to be in a terminal
delirium at this point. 
 
 Prognosis is terminal.  Her daughter, Sarah, was at
 
 
 
[address] [place]   [address]                     DISCHARGE SUMMARY             
_______________________________________________________________________________
 
Name:       DEVEN BLANCAS                  Room #:         [address]B      [facility] [place]  
..#:      [id number]                       Account #:      [id number]  
Admission:  07/03/19    Attend Phys:    Eduardo Almonte DO
Discharge:  08/09/19    Date of Birth:  [birth year]  
                                                          Report #: [id number]
                                                                    [id number]   
_______________________________________________________________________________
bedside today.  I have discussed management with her.  As of yesterday, hospice
also has had refused the patient.
 
 
 
 
 
 
 
 
 
 
 
 
 
 
 
 
 
 
 
 
 
 
 
 
 
 
 
 
 
 
 
 
 
 
 
 
 
 
 
 
 
 
  <ELECTRONICALLY SIGNED>
   By: Eduardo Almonte DO        
  08/11/19     1033
D: 08/09/19 1958                           _____________________________________
T: 08/09/19 2013                           Eduardo Almonte DO          /nt
